# Patient Record
Sex: FEMALE | Race: WHITE | NOT HISPANIC OR LATINO | Employment: PART TIME | URBAN - METROPOLITAN AREA
[De-identification: names, ages, dates, MRNs, and addresses within clinical notes are randomized per-mention and may not be internally consistent; named-entity substitution may affect disease eponyms.]

---

## 2020-06-23 ENCOUNTER — HISTORICAL (OUTPATIENT)
Dept: LAB | Facility: HOSPITAL | Age: 65
End: 2020-06-23

## 2020-06-23 LAB
ABS NEUT (OLG): 3.77 X10(3)/MCL (ref 2.1–9.2)
ALBUMIN SERPL-MCNC: 3.6 GM/DL (ref 3.4–4.8)
ALBUMIN/GLOB SERPL: 1.1 RATIO (ref 1.1–2)
ALP SERPL-CCNC: 114 UNIT/L (ref 40–150)
ALT SERPL-CCNC: 17 UNIT/L (ref 0–55)
AST SERPL-CCNC: 19 UNIT/L (ref 5–34)
BASOPHILS # BLD AUTO: 0.03 X10(3)/MCL (ref 0–0.2)
BASOPHILS NFR BLD AUTO: 0.3 % (ref 0–1)
BILIRUB SERPL-MCNC: 0.3 MG/DL (ref 0.2–1.2)
BILIRUBIN DIRECT+TOT PNL SERPL-MCNC: 0.1 MG/DL (ref 0–0.8)
BILIRUBIN DIRECT+TOT PNL SERPL-MCNC: 0.2 MG/DL (ref 0–0.5)
BUN SERPL-MCNC: 12.8 MG/DL (ref 9.8–20.1)
CALCIUM SERPL-MCNC: 9.3 MG/DL (ref 8.4–10.2)
CHLORIDE SERPL-SCNC: 105 MMOL/L (ref 98–107)
CHOLEST SERPL-MCNC: 139 MG/DL
CHOLEST/HDLC SERPL: 3 {RATIO} (ref 0–5)
CO2 SERPL-SCNC: 29 MMOL/L (ref 23–31)
CREAT SERPL-MCNC: 0.82 MG/DL (ref 0.57–1.11)
EOSINOPHIL # BLD AUTO: 0.2 X10(3)/MCL (ref 0–0.9)
EOSINOPHIL NFR BLD AUTO: 2.3 % (ref 0–6.4)
ERYTHROCYTE [DISTWIDTH] IN BLOOD BY AUTOMATED COUNT: 12.9 % (ref 11.5–17)
GLOBULIN SER-MCNC: 3.2 GM/DL (ref 2.4–3.5)
GLUCOSE SERPL-MCNC: 94 MG/DL (ref 82–115)
HCT VFR BLD AUTO: 44.1 % (ref 37–47)
HDLC SERPL-MCNC: 44 MG/DL (ref 40–60)
HGB BLD-MCNC: 14.2 GM/DL (ref 12–16)
IMM GRANULOCYTES # BLD AUTO: 0.01 10*3/UL (ref 0–0.02)
IMM GRANULOCYTES NFR BLD AUTO: 0.1 % (ref 0–0.43)
LDLC SERPL CALC-MCNC: 73 MG/DL (ref 50–140)
LYMPHOCYTES # BLD AUTO: 3.98 X10(3)/MCL (ref 0.6–4.6)
LYMPHOCYTES NFR BLD AUTO: 46 % (ref 16–44)
MCH RBC QN AUTO: 29.3 PG (ref 27–31)
MCHC RBC AUTO-ENTMCNC: 32.2 GM/DL (ref 33–36)
MCV RBC AUTO: 90.9 FL (ref 80–94)
MONOCYTES # BLD AUTO: 0.66 X10(3)/MCL (ref 0.1–1.3)
MONOCYTES NFR BLD AUTO: 7.6 % (ref 4–12.1)
NEUTROPHILS # BLD AUTO: 3.77 X10(3)/MCL (ref 2.1–9.2)
NEUTROPHILS NFR BLD AUTO: 43.7 % (ref 43–73)
NRBC BLD AUTO-RTO: 0 % (ref 0–0.2)
PLATELET # BLD AUTO: 200 X10(3)/MCL (ref 130–400)
PMV BLD AUTO: 10.6 FL (ref 7.4–10.4)
POTASSIUM SERPL-SCNC: 4 MMOL/L (ref 3.5–5.1)
PROT SERPL-MCNC: 6.8 GM/DL (ref 5.8–7.6)
RBC # BLD AUTO: 4.85 X10(6)/MCL (ref 4.2–5.4)
SODIUM SERPL-SCNC: 141 MMOL/L (ref 136–145)
TRIGL SERPL-MCNC: 111 MG/DL (ref 0–150)
TSH SERPL-ACNC: 1.23 UIU/ML (ref 0.35–4.94)
VLDLC SERPL CALC-MCNC: 22 MG/DL
WBC # SPEC AUTO: 8.6 X10(3)/MCL (ref 4.5–11.5)

## 2021-06-03 ENCOUNTER — HISTORICAL (OUTPATIENT)
Dept: LAB | Facility: HOSPITAL | Age: 66
End: 2021-06-03

## 2021-06-03 LAB
ABS NEUT (OLG): 3.87 X10(3)/MCL (ref 2.1–9.2)
ALBUMIN SERPL-MCNC: 3.9 GM/DL (ref 3.4–4.8)
ALBUMIN/GLOB SERPL: 1.1 RATIO (ref 1.1–2)
ALP SERPL-CCNC: 111 UNIT/L (ref 40–150)
ALT SERPL-CCNC: 20 UNIT/L (ref 0–55)
AST SERPL-CCNC: 21 UNIT/L (ref 5–34)
BASOPHILS # BLD AUTO: 0.04 X10(3)/MCL (ref 0–0.2)
BASOPHILS NFR BLD AUTO: 0.5 % (ref 0–1)
BILIRUB SERPL-MCNC: 0.6 MG/DL (ref 0.2–1.2)
BILIRUBIN DIRECT+TOT PNL SERPL-MCNC: 0.2 MG/DL (ref 0–0.5)
BILIRUBIN DIRECT+TOT PNL SERPL-MCNC: 0.4 MG/DL (ref 0–0.8)
BUN SERPL-MCNC: 13 MG/DL (ref 9.8–20.1)
CALCIUM SERPL-MCNC: 10.1 MG/DL (ref 8.4–10.2)
CHLORIDE SERPL-SCNC: 105 MMOL/L (ref 98–107)
CHOLEST SERPL-MCNC: 143 MG/DL
CHOLEST/HDLC SERPL: 3 {RATIO} (ref 0–5)
CO2 SERPL-SCNC: 30 MMOL/L (ref 23–31)
CREAT SERPL-MCNC: 0.79 MG/DL (ref 0.57–1.11)
EOSINOPHIL # BLD AUTO: 0.22 X10(3)/MCL (ref 0–0.9)
EOSINOPHIL NFR BLD AUTO: 2.5 % (ref 0–6.4)
ERYTHROCYTE [DISTWIDTH] IN BLOOD BY AUTOMATED COUNT: 12.7 % (ref 11.5–17)
GLOBULIN SER-MCNC: 3.5 GM/DL (ref 2.4–3.5)
GLUCOSE SERPL-MCNC: 109 MG/DL (ref 82–115)
HCT VFR BLD AUTO: 45.9 % (ref 37–47)
HDLC SERPL-MCNC: 46 MG/DL (ref 40–60)
HGB BLD-MCNC: 15.2 GM/DL (ref 12–16)
IMM GRANULOCYTES # BLD AUTO: 0.02 10*3/UL (ref 0–0.02)
IMM GRANULOCYTES NFR BLD AUTO: 0.2 % (ref 0–0.43)
LDLC SERPL CALC-MCNC: 73 MG/DL (ref 50–140)
LYMPHOCYTES # BLD AUTO: 3.82 X10(3)/MCL (ref 0.6–4.6)
LYMPHOCYTES NFR BLD AUTO: 44.3 % (ref 16–44)
MCH RBC QN AUTO: 29.8 PG (ref 27–31)
MCHC RBC AUTO-ENTMCNC: 33.1 GM/DL (ref 33–36)
MCV RBC AUTO: 90 FL (ref 80–94)
MONOCYTES # BLD AUTO: 0.66 X10(3)/MCL (ref 0.1–1.3)
MONOCYTES NFR BLD AUTO: 7.6 % (ref 4–12.1)
NEUTROPHILS # BLD AUTO: 3.87 X10(3)/MCL (ref 2.1–9.2)
NEUTROPHILS NFR BLD AUTO: 44.9 % (ref 43–73)
NRBC BLD AUTO-RTO: 0 % (ref 0–0.2)
PLATELET # BLD AUTO: 208 X10(3)/MCL (ref 130–400)
PMV BLD AUTO: 10.7 FL (ref 7.4–10.4)
POTASSIUM SERPL-SCNC: 4.3 MMOL/L (ref 3.5–5.1)
PROT SERPL-MCNC: 7.4 GM/DL (ref 5.8–7.6)
RBC # BLD AUTO: 5.1 X10(6)/MCL (ref 4.2–5.4)
SODIUM SERPL-SCNC: 141 MMOL/L (ref 136–145)
TRIGL SERPL-MCNC: 119 MG/DL (ref 0–150)
TSH SERPL-ACNC: 1.76 UIU/ML (ref 0.35–4.94)
VLDLC SERPL CALC-MCNC: 24 MG/DL
WBC # SPEC AUTO: 8.6 X10(3)/MCL (ref 4.5–11.5)

## 2021-06-04 LAB
PAP RECOMMENDATION EXT: NORMAL
PAP SMEAR: NORMAL

## 2021-07-09 LAB — BCS RECOMMENDATION EXT: NORMAL

## 2022-04-10 ENCOUNTER — HISTORICAL (OUTPATIENT)
Dept: ADMINISTRATIVE | Facility: HOSPITAL | Age: 67
End: 2022-04-10
Payer: COMMERCIAL

## 2022-04-27 VITALS
HEIGHT: 65 IN | BODY MASS INDEX: 27.7 KG/M2 | WEIGHT: 166.25 LBS | SYSTOLIC BLOOD PRESSURE: 131 MMHG | OXYGEN SATURATION: 99 % | DIASTOLIC BLOOD PRESSURE: 62 MMHG

## 2022-05-25 ENCOUNTER — TELEPHONE (OUTPATIENT)
Dept: FAMILY MEDICINE | Facility: CLINIC | Age: 67
End: 2022-05-25
Payer: COMMERCIAL

## 2022-05-25 NOTE — TELEPHONE ENCOUNTER
Patient stated she goes to the [facility] lab for labwork so I notified her that she will not need physical lab orders, but if there are issues when she arrives she can contact the office for assistance. Patient verbalized understanding        ----- Message from Ezekiel Michaels sent at 5/25/2022  1:56 PM CDT -----  .Type:  Needs Medical Advice    Who Called: Ledy  Symptoms (please be specific):    How long has patient had these symptoms:    Pharmacy name and phone #:    Would the patient rather a call back or a response via [facility]?   Best Call Back Number: [phone]  Additional Information: She needs to know where she can go and get her paperwork for the blood work ahead of her apt.

## 2022-05-31 ENCOUNTER — TELEPHONE (OUTPATIENT)
Dept: FAMILY MEDICINE | Facility: CLINIC | Age: 67
End: 2022-05-31
Payer: COMMERCIAL

## 2022-05-31 DIAGNOSIS — Z00.00 WELLNESS EXAMINATION: Primary | ICD-10-CM

## 2022-05-31 DIAGNOSIS — E03.9 HYPOTHYROIDISM, UNSPECIFIED TYPE: ICD-10-CM

## 2022-06-02 ENCOUNTER — LAB VISIT (OUTPATIENT)
Dept: LAB | Facility: HOSPITAL | Age: 67
End: 2022-06-02
Attending: NURSE PRACTITIONER
Payer: COMMERCIAL

## 2022-06-02 DIAGNOSIS — R82.71 BACTERIURIA: Primary | ICD-10-CM

## 2022-06-02 DIAGNOSIS — E03.9 HYPOTHYROIDISM, UNSPECIFIED TYPE: ICD-10-CM

## 2022-06-02 DIAGNOSIS — Z00.00 WELLNESS EXAMINATION: ICD-10-CM

## 2022-06-02 DIAGNOSIS — R82.71 BACTERIURIA: ICD-10-CM

## 2022-06-02 LAB
ALBUMIN SERPL-MCNC: 3.9 GM/DL (ref 3.4–4.8)
ALBUMIN/GLOB SERPL: 1.1 RATIO (ref 1.1–2)
ALP SERPL-CCNC: 95 UNIT/L (ref 40–150)
ALT SERPL-CCNC: 18 UNIT/L (ref 0–55)
APPEARANCE UR: ABNORMAL
AST SERPL-CCNC: 21 UNIT/L (ref 5–34)
BACTERIA #/AREA URNS AUTO: ABNORMAL /HPF
BASOPHILS # BLD AUTO: 0.04 X10(3)/MCL (ref 0–0.2)
BASOPHILS NFR BLD AUTO: 0.5 %
BILIRUB UR QL STRIP.AUTO: NEGATIVE MG/DL
BILIRUBIN DIRECT+TOT PNL SERPL-MCNC: 0.7 MG/DL
BUN SERPL-MCNC: 11.4 MG/DL (ref 9.8–20.1)
CALCIUM SERPL-MCNC: 9.6 MG/DL (ref 8.4–10.2)
CHLORIDE SERPL-SCNC: 106 MMOL/L (ref 98–107)
CHOLEST SERPL-MCNC: 144 MG/DL
CHOLEST/HDLC SERPL: 3 {RATIO} (ref 0–5)
CO2 SERPL-SCNC: 27 MMOL/L (ref 23–31)
COLOR UR AUTO: YELLOW
CREAT SERPL-MCNC: 0.81 MG/DL (ref 0.55–1.02)
EOSINOPHIL # BLD AUTO: 0.22 X10(3)/MCL (ref 0–0.9)
EOSINOPHIL NFR BLD AUTO: 2.9 %
ERYTHROCYTE [DISTWIDTH] IN BLOOD BY AUTOMATED COUNT: 12.9 % (ref 11.5–17)
EST. AVERAGE GLUCOSE BLD GHB EST-MCNC: 116.9 MG/DL
GLOBULIN SER-MCNC: 3.4 GM/DL (ref 2.4–3.5)
GLUCOSE SERPL-MCNC: 104 MG/DL (ref 82–115)
GLUCOSE UR QL STRIP.AUTO: NEGATIVE MG/DL
HBA1C MFR BLD: 5.7 %
HCT VFR BLD AUTO: 46.9 % (ref 37–47)
HDLC SERPL-MCNC: 46 MG/DL (ref 35–60)
HGB BLD-MCNC: 15.4 GM/DL (ref 12–16)
IMM GRANULOCYTES # BLD AUTO: 0.02 X10(3)/MCL (ref 0–0.02)
IMM GRANULOCYTES NFR BLD AUTO: 0.3 % (ref 0–0.43)
KETONES UR QL STRIP.AUTO: NEGATIVE MG/DL
LDLC SERPL CALC-MCNC: 76 MG/DL (ref 50–140)
LEUKOCYTE ESTERASE UR QL STRIP.AUTO: ABNORMAL UNIT/L
LYMPHOCYTES # BLD AUTO: 3.32 X10(3)/MCL (ref 0.6–4.6)
LYMPHOCYTES NFR BLD AUTO: 43.7 %
MCH RBC QN AUTO: 30.1 PG (ref 27–31)
MCHC RBC AUTO-ENTMCNC: 32.8 MG/DL (ref 33–36)
MCV RBC AUTO: 91.6 FL (ref 80–94)
MONOCYTES # BLD AUTO: 0.57 X10(3)/MCL (ref 0.1–1.3)
MONOCYTES NFR BLD AUTO: 7.5 %
MUCOUS THREADS URNS QL MICRO: ABNORMAL /LPF
NEUTROPHILS # BLD AUTO: 3.4 X10(3)/MCL (ref 2.1–9.2)
NEUTROPHILS NFR BLD AUTO: 45.1 %
NITRITE UR QL STRIP.AUTO: NEGATIVE
NRBC BLD AUTO-RTO: 0 %
PH UR STRIP.AUTO: 6.5 [PH]
PLATELET # BLD AUTO: 213 X10(3)/MCL (ref 130–400)
PMV BLD AUTO: 10.4 FL (ref 9.4–12.4)
POTASSIUM SERPL-SCNC: 4.2 MMOL/L (ref 3.5–5.1)
PROT SERPL-MCNC: 7.3 GM/DL (ref 5.8–7.6)
PROT UR QL STRIP.AUTO: NEGATIVE MG/DL
RBC # BLD AUTO: 5.12 X10(6)/MCL (ref 4.2–5.4)
RBC #/AREA URNS AUTO: ABNORMAL /HPF
RBC UR QL AUTO: ABNORMAL UNIT/L
SODIUM SERPL-SCNC: 142 MMOL/L (ref 136–145)
SP GR UR STRIP.AUTO: 1.02
SQUAMOUS #/AREA URNS AUTO: ABNORMAL /LPF
TRIGL SERPL-MCNC: 112 MG/DL (ref 37–140)
TSH SERPL-ACNC: 1.6 UIU/ML (ref 0.35–4.94)
UROBILINOGEN UR STRIP-ACNC: 0.2 MG/DL
VLDLC SERPL CALC-MCNC: 22 MG/DL
WBC # SPEC AUTO: 7.6 X10(3)/MCL (ref 4.5–11.5)
WBC #/AREA URNS AUTO: ABNORMAL /HPF

## 2022-06-02 PROCEDURE — 87088 URINE BACTERIA CULTURE: CPT

## 2022-06-02 PROCEDURE — 84443 ASSAY THYROID STIM HORMONE: CPT

## 2022-06-02 PROCEDURE — 80053 COMPREHEN METABOLIC PANEL: CPT

## 2022-06-02 PROCEDURE — 83036 HEMOGLOBIN GLYCOSYLATED A1C: CPT

## 2022-06-02 PROCEDURE — 80061 LIPID PANEL: CPT

## 2022-06-02 PROCEDURE — 85025 COMPLETE CBC W/AUTO DIFF WBC: CPT

## 2022-06-02 PROCEDURE — 81001 URINALYSIS AUTO W/SCOPE: CPT

## 2022-06-02 PROCEDURE — 36415 COLL VENOUS BLD VENIPUNCTURE: CPT

## 2022-06-04 LAB — BACTERIA UR CULT: NORMAL

## 2022-06-06 ENCOUNTER — OFFICE VISIT (OUTPATIENT)
Dept: FAMILY MEDICINE | Facility: CLINIC | Age: 67
End: 2022-06-06
Payer: COMMERCIAL

## 2022-06-06 VITALS
DIASTOLIC BLOOD PRESSURE: 74 MMHG | HEART RATE: 83 BPM | WEIGHT: 165.88 LBS | RESPIRATION RATE: 18 BRPM | OXYGEN SATURATION: 98 % | SYSTOLIC BLOOD PRESSURE: 122 MMHG | BODY MASS INDEX: 27.64 KG/M2 | TEMPERATURE: 98 F | HEIGHT: 65 IN

## 2022-06-06 DIAGNOSIS — E78.5 HYPERLIPIDEMIA, UNSPECIFIED HYPERLIPIDEMIA TYPE: ICD-10-CM

## 2022-06-06 DIAGNOSIS — R00.2 PALPITATIONS: ICD-10-CM

## 2022-06-06 DIAGNOSIS — Z00.00 WELLNESS EXAMINATION: Primary | ICD-10-CM

## 2022-06-06 DIAGNOSIS — E03.9 HYPOTHYROIDISM, UNSPECIFIED TYPE: ICD-10-CM

## 2022-06-06 DIAGNOSIS — F17.210 CIGARETTE SMOKER: ICD-10-CM

## 2022-06-06 DIAGNOSIS — Z12.31 BREAST CANCER SCREENING BY MAMMOGRAM: ICD-10-CM

## 2022-06-06 DIAGNOSIS — Z78.0 POSTMENOPAUSAL: ICD-10-CM

## 2022-06-06 DIAGNOSIS — Z12.11 COLON CANCER SCREENING: ICD-10-CM

## 2022-06-06 PROCEDURE — 99396 PREV VISIT EST AGE 40-64: CPT | Mod: ,,, | Performed by: FAMILY MEDICINE

## 2022-06-06 PROCEDURE — 99396 PR PREVENTIVE VISIT,EST,40-64: ICD-10-PCS | Mod: ,,, | Performed by: FAMILY MEDICINE

## 2022-06-06 RX ORDER — LEVOTHYROXINE SODIUM 88 UG/1
88 TABLET ORAL DAILY
COMMUNITY
Start: 2022-05-13 | End: 2022-07-20 | Stop reason: SDUPTHER

## 2022-06-06 RX ORDER — METOPROLOL SUCCINATE 50 MG/1
50 TABLET, EXTENDED RELEASE ORAL DAILY
COMMUNITY
Start: 2022-05-13 | End: 2022-07-20 | Stop reason: SDUPTHER

## 2022-06-06 RX ORDER — ATORVASTATIN CALCIUM 10 MG/1
10 TABLET, FILM COATED ORAL DAILY
COMMUNITY
Start: 2022-05-13 | End: 2022-07-20 | Stop reason: SDUPTHER

## 2022-06-06 NOTE — PROGRESS NOTES
"Subjective:        Patient ID: Ledy Kim is a 66 y.o. female.    Chief Complaint: Annual Exam      Patient presents to clinic unaccompanied for her wellness.  She did labs prior to appointment and it was reviewed with patient at time of appointment today.  Her ua showed concern for uti but denies symptoms.  Urine culture added. Will call with results when available    She has a history of HLD and palpitations.  She is on atrovastatin and metoprolol and asa    She has history of hypothyroidism.  tsh is wnl.  On synthroid 88mcg    She is due for her mammogram at [facility].  Last 7/2021.  Last dexa 7/2021 at [facility] showed osteopenia.  Last pap 6/2021 is normal and HPV negative.  She is due for cologuard.      She is smoker.  Working on [employer] back.     Review of Systems   Constitutional: Negative.    HENT: Negative.    Respiratory: Negative.    Cardiovascular: Negative.    Gastrointestinal: Negative.    Genitourinary: Negative.          Review of patient's allergies indicates:   Allergen Reactions    Codeine Itching      Vitals:    06/06/22 0921   BP: 122/74   BP Location: Left arm   Pulse: 83   Resp: 18   Temp: 98.3 °F (36.8 °C)   SpO2: 98%   Weight: 75.3 kg (165 lb 14.4 oz)   Height: 5' 5" (1.651 m)      Social History     Socioeconomic History    Marital status:    Tobacco Use    Smoking status: Heavy Tobacco Smoker     Packs/day: 1.00    Smokeless tobacco: Never Used   Substance and Sexual Activity    Alcohol use: Not Currently    Drug use: Never    Sexual activity: Yes      History reviewed. No pertinent family history.       Objective:     Physical Exam  Vitals and nursing note reviewed.   Constitutional:       Appearance: Normal appearance. She is normal weight.   HENT:      Head: Normocephalic and atraumatic.      Nose: Nose normal.      Mouth/Throat:      Mouth: Mucous membranes are moist.      Pharynx: Oropharynx is clear.   Eyes:      Extraocular Movements: Extraocular movements intact. "   Cardiovascular:      Rate and Rhythm: Normal rate and regular rhythm.      Pulses: Normal pulses.      Heart sounds: Normal heart sounds.   Pulmonary:      Effort: Pulmonary effort is normal.      Breath sounds: Normal breath sounds.   Musculoskeletal:         General: Normal range of motion.      Cervical back: Normal range of motion.   Skin:     General: Skin is warm and dry.   Neurological:      General: No focal deficit present.      Mental Status: She is alert and oriented to person, place, and time. Mental status is at baseline.   Psychiatric:         Mood and Affect: Mood normal.       Current Outpatient Medications on File Prior to Visit   Medication Sig Dispense Refill    atorvastatin (LIPITOR) 10 MG tablet Take 10 mg by mouth once daily.      levothyroxine (SYNTHROID) 88 MCG tablet Take 88 mcg by mouth once daily.      metoprolol succinate (TOPROL-XL) 50 MG 24 hr tablet Take 50 mg by mouth once daily.       No current facility-administered medications on file prior to visit.     Health Maintenance   Topic Date Due    Hepatitis C Screening  Never done    TETANUS VACCINE  Never done    Mammogram  07/09/2022    Lipid Panel  06/02/2027      Results for orders placed or performed in visit on 06/02/22   Urine culture    Specimen: Urine, Clean Catch   Result Value Ref Range    Urine Culture No Significant Growth    Comprehensive Metabolic Panel   Result Value Ref Range    Sodium Level 142 136 - 145 mmol/L    Potassium Level 4.2 3.5 - 5.1 mmol/L    Chloride 106 98 - 107 mmol/L    Carbon Dioxide 27 23 - 31 mmol/L    Glucose Level 104 82 - 115 mg/dL    Blood Urea Nitrogen 11.4 9.8 - 20.1 mg/dL    Creatinine 0.81 0.55 - 1.02 mg/dL    Calcium Level Total 9.6 8.4 - 10.2 mg/dL    Protein Total 7.3 5.8 - 7.6 gm/dL    Albumin Level 3.9 3.4 - 4.8 gm/dL    Globulin 3.4 2.4 - 3.5 gm/dL    Albumin/Globulin Ratio 1.1 1.1 - 2.0 ratio    Bilirubin Total 0.7 <=1.5 mg/dL    Alkaline Phosphatase 95 40 - 150 unit/L     Alanine Aminotransferase 18 0 - 55 unit/L    Aspartate Aminotransferase 21 5 - 34 unit/L   Hemoglobin A1C   Result Value Ref Range    Hemoglobin A1c 5.7 <=7.0 %    Estimated Average Glucose 116.9 mg/dL   TSH   Result Value Ref Range    Thyroid Stimulating Hormone 1.5968 0.3500 - 4.9400 uIU/mL   Lipid Panel   Result Value Ref Range    Cholesterol Total 144 <=200 mg/dL    HDL Cholesterol 46 35 - 60 mg/dL    Triglyceride 112 37 - 140 mg/dL    Cholesterol/HDL Ratio 3 0 - 5    Very Low Density Lipoprotein 22     LDL Cholesterol 76.00 50.00 - 140.00 mg/dL   CBC with Differential   Result Value Ref Range    WBC 7.6 4.5 - 11.5 x10(3)/mcL    RBC 5.12 4.20 - 5.40 x10(6)/mcL    Hgb 15.4 12.0 - 16.0 gm/dL    Hct 46.9 37.0 - 47.0 %    MCV 91.6 80.0 - 94.0 fL    MCH 30.1 27.0 - 31.0 pg    MCHC 32.8 (L) 33.0 - 36.0 mg/dL    RDW 12.9 11.5 - 17.0 %    Platelet 213 130 - 400 x10(3)/mcL    MPV 10.4 9.4 - 12.4 fL    Neut % 45.1 %    Lymph % 43.7 %    Mono % 7.5 %    Eos % 2.9 %    Basophil % 0.5 %    Lymph # 3.32 0.6 - 4.6 x10(3)/mcL    Neut # 3.4 2.1 - 9.2 x10(3)/mcL    Mono # 0.57 0.1 - 1.3 x10(3)/mcL    Eos # 0.22 0 - 0.9 x10(3)/mcL    Baso # 0.04 0 - 0.2 x10(3)/mcL    IG# 0.02 (H) 0 - 0.0155 x10(3)/mcL    IG% 0.3 0 - 0.43 %    NRBC% 0.0 %   Urinalysis   Result Value Ref Range    Color, UA Yellow Yellow, Colorless, Other, Clear    Appearance, UA Hazy (A) Clear    Specific Gravity, UA 1.020     pH, UA 6.5 5.0, 5.5, 6.0, 6.5, 7.0, 7.5, 8.0, 8.5    Protein, UA Negative Negative, 300  mg/dL    Glucose, UA Negative Negative, Normal mg/dL    Ketones, UA Negative Negative, +1, +2, +3, +4, +5, >=160 mg/dL    Blood, UA Moderate (A) Negative unit/L    Bilirubin, UA Negative Negative mg/dL    Urobilinogen, UA 0.2 0.2, 1.0, Normal mg/dL    Nitrites, UA Negative Negative    Leukocyte Esterase, UA Moderate (A) Negative, 75  unit/L   Urinalysis, Microscopic   Result Value Ref Range    Bacteria, UA Few (A) None Seen, Rare, Occasional /HPF     Mucous, UA Occ (A) None Seen /LPF    RBC, UA 0-2 None Seen, 0-2, 3-5, 0-5 /HPF    WBC, UA 0-2 None Seen, 0-2, 3-5, 0-5 /HPF    Squamous Epithelial Cells, UA Moderate (A) None Seen, Rare, Occasional, Occ /LPF          Assessment & Plan:     Active Problem List with Overview Notes    Diagnosis Date Noted    Wellness examination 06/06/2022    Hyperlipidemia 06/06/2022    Palpitations 06/06/2022    Hypothyroidism 06/06/2022    Postmenopausal 06/06/2022    Cigarette smoker 06/06/2022    Breast cancer screening by mammogram 06/06/2022    Colon cancer screening 06/06/2022       1. Wellness examination  Assessment & Plan:  Previously done labs reviewed with patient at time of visit  Pap 6/2021 normal and HPV negative  Mammogram due. Ordered  dexa 6/2021 showed osteopenia  cologuard ordered        Orders:  -     CBC Auto Differential  -     Comprehensive Metabolic Panel  -     Lipid Panel  -     TSH  -     Urinalysis, Reflex to Urine Culture Urine, Clean Catch    2. Breast cancer screening by mammogram  Assessment & Plan:  ordered    Orders:  -     CBC Auto Differential  -     Comprehensive Metabolic Panel  -     Lipid Panel  -     TSH  -     Urinalysis, Reflex to Urine Culture Urine, Clean Catch  -     Mammo Digital Screening Bilat w/ Gab    3. Colon cancer screening  Assessment & Plan:  cologuard ordered    Orders:  -     Cologuard Screening (Multitarget Stool DNA)  -     Cologuard Screening (Multitarget Stool DNA)  -     CBC Auto Differential  -     Comprehensive Metabolic Panel  -     Lipid Panel  -     TSH  -     Urinalysis, Reflex to Urine Culture Urine, Clean Catch    4. Cigarette smoker  Assessment & Plan:  Referral placed    Orders:  -     Ambulatory referral/consult to Smoking Cessation Program  -     CBC Auto Differential  -     Comprehensive Metabolic Panel  -     Lipid Panel  -     TSH  -     Urinalysis, Reflex to Urine Culture Urine, Clean Catch    5. Hypothyroidism, unspecified type  Assessment &  Plan:  Lab Results   Component Value Date    TSH 1.5968 06/02/2022     Continue on current meds    Orders:  -     CBC Auto Differential  -     Comprehensive Metabolic Panel  -     Lipid Panel  -     TSH  -     Urinalysis, Reflex to Urine Culture Urine, Clean Catch    6. Postmenopausal  Assessment & Plan:  utd on dexa.     Orders:  -     CBC Auto Differential  -     Comprehensive Metabolic Panel  -     Lipid Panel  -     TSH  -     Urinalysis, Reflex to Urine Culture Urine, Clean Catch    7. Palpitations  Assessment & Plan:  On metoprolol    Orders:  -     CBC Auto Differential  -     Comprehensive Metabolic Panel  -     Lipid Panel  -     TSH  -     Urinalysis, Reflex to Urine Culture Urine, Clean Catch    8. Hyperlipidemia, unspecified hyperlipidemia type  Assessment & Plan:  On statin    Orders:  -     CBC Auto Differential  -     Comprehensive Metabolic Panel  -     Lipid Panel  -     TSH  -     Urinalysis, Reflex to Urine Culture Urine, Clean Catch       Follow up in about 1 year (around 6/6/2023) for wellness with labs.

## 2022-06-06 NOTE — ASSESSMENT & PLAN NOTE
Previously done labs reviewed with patient at time of visit  Pap 6/2021 normal and HPV negative  Mammogram due. Ordered  dexa 6/2021 showed osteopenia  cologuard ordered

## 2022-06-25 LAB — NONINV COLON CA DNA+OCC BLD SCRN STL QL: NEGATIVE

## 2022-06-27 NOTE — PROGRESS NOTES
Please call patient to inform that cologuard testing is normal/negative.  Will plan to screen for colon cancer again in 3 years.

## 2022-06-28 ENCOUNTER — OFFICE VISIT (OUTPATIENT)
Dept: FAMILY MEDICINE | Facility: CLINIC | Age: 67
End: 2022-06-28
Payer: COMMERCIAL

## 2022-06-28 ENCOUNTER — DOCUMENTATION ONLY (OUTPATIENT)
Dept: ADMINISTRATIVE | Facility: HOSPITAL | Age: 67
End: 2022-06-28
Payer: COMMERCIAL

## 2022-06-28 VITALS
RESPIRATION RATE: 17 BRPM | DIASTOLIC BLOOD PRESSURE: 70 MMHG | SYSTOLIC BLOOD PRESSURE: 120 MMHG | WEIGHT: 166.13 LBS | TEMPERATURE: 98 F | HEART RATE: 63 BPM | OXYGEN SATURATION: 99 % | BODY MASS INDEX: 27.64 KG/M2

## 2022-06-28 DIAGNOSIS — H61.23 BILATERAL IMPACTED CERUMEN: ICD-10-CM

## 2022-06-28 PROBLEM — H61.20 CERUMEN IMPACTION: Status: ACTIVE | Noted: 2022-06-28

## 2022-06-28 PROCEDURE — 99212 PR OFFICE/OUTPT VISIT, EST, LEVL II, 10-19 MIN: ICD-10-PCS | Mod: 25,,, | Performed by: FAMILY MEDICINE

## 2022-06-28 PROCEDURE — 69209 PR REMOVAL IMPACTED CERUMEN USING IRRIGATION/LAVAGE, UNILATERAL: ICD-10-PCS | Mod: 50,,, | Performed by: FAMILY MEDICINE

## 2022-06-28 PROCEDURE — 99212 OFFICE O/P EST SF 10 MIN: CPT | Mod: 25,,, | Performed by: FAMILY MEDICINE

## 2022-06-28 PROCEDURE — 69209 REMOVE IMPACTED EAR WAX UNI: CPT | Mod: 50,,, | Performed by: FAMILY MEDICINE

## 2022-06-28 NOTE — PROGRESS NOTES
Subjective:        Patient ID: Ledy Kim is a 66 y.o. female.    Chief Complaint: Follow-up (Paient reports build up of cerumen. Denies ear pain)      Patient presents to clinic unaccompanied for cerumen impaction.  Has been swimming more.  Denies pain or discharge.  Denies decreased hearing.  Has been using debrox in her right ear.             She has a history of HLD and palpitations.  She is on atrovastatin and metoprolol and asa     She has history of hypothyroidism.  tsh is wnl.  On synthroid 88mcg     She is due for her mammogram at [facility].  Last 7/2021.  Last dexa 7/2021 at [facility] showed osteopenia.  Last pap 6/2021 is normal and HPV negative.  She is due for cologuard.       She is smoker.  Working on cutting back.     She is due for her wellness in June.     Review of Systems   Constitutional: Negative.    HENT: Negative.  Negative for ear discharge, ear pain, sinus pressure, sinus pain, sneezing, sore throat, tinnitus and voice change.    Respiratory: Negative.    Cardiovascular: Negative.    Gastrointestinal: Negative.    Genitourinary: Negative.          Review of patient's allergies indicates:   Allergen Reactions    Codeine Itching      Vitals:    06/28/22 1504   BP: 120/70   BP Location: Left arm   Pulse: 63   Resp: 17   Temp: 98 °F (36.7 °C)   SpO2: 99%   Weight: 75.3 kg (166 lb 1.6 oz)      Social History     Socioeconomic History    Marital status:    Tobacco Use    Smoking status: Heavy Tobacco Smoker     Packs/day: 1.00    Smokeless tobacco: Never Used   Substance and Sexual Activity    Alcohol use: Not Currently    Drug use: Never    Sexual activity: Yes      History reviewed. No pertinent family history.       Objective:     Physical Exam  Vitals reviewed.   Constitutional:       Appearance: Normal appearance. She is normal weight.   HENT:      Head: Normocephalic and atraumatic.      Right Ear: External ear normal. There is impacted cerumen.      Left Ear: External ear normal.  There is impacted cerumen.      Nose: Nose normal.      Mouth/Throat:      Mouth: Mucous membranes are moist.      Pharynx: Oropharynx is clear.   Eyes:      Extraocular Movements: Extraocular movements intact.   Cardiovascular:      Rate and Rhythm: Normal rate and regular rhythm.      Pulses: Normal pulses.      Heart sounds: Normal heart sounds.   Pulmonary:      Effort: Pulmonary effort is normal.      Breath sounds: Normal breath sounds.   Musculoskeletal:         General: Normal range of motion.      Cervical back: Normal range of motion.   Skin:     General: Skin is warm and dry.   Neurological:      General: No focal deficit present.      Mental Status: She is alert and oriented to person, place, and time. Mental status is at baseline.   Psychiatric:         Mood and Affect: Mood normal.       Current Outpatient Medications on File Prior to Visit   Medication Sig Dispense Refill    atorvastatin (LIPITOR) 10 MG tablet Take 10 mg by mouth once daily.      levothyroxine (SYNTHROID) 88 MCG tablet Take 88 mcg by mouth once daily.      metoprolol succinate (TOPROL-XL) 50 MG 24 hr tablet Take 50 mg by mouth once daily.       No current facility-administered medications on file prior to visit.     Health Maintenance   Topic Date Due    Hepatitis C Screening  Never done    TETANUS VACCINE  Never done    Mammogram  07/09/2022    Lipid Panel  06/02/2027      Results for orders placed or performed in visit on 06/28/22    MAMMOGRAPHY   Result Value Ref Range    BCS Recommendation External Repeat mammogram in 1 year     PAP SMEAR   Result Value Ref Range    PAP Recommendation External Pap in 3 years     Pap Negative for intraephithelial lesion or malignancy Negative for intraephithelial lesion or malignancy, Other          Assessment & Plan:     Active Problem List with Overview Notes    Diagnosis Date Noted    Cerumen impaction 06/28/2022    Wellness examination 06/06/2022    Hyperlipidemia 06/06/2022     Palpitations 06/06/2022    Hypothyroidism 06/06/2022    Postmenopausal 06/06/2022    Cigarette smoker 06/06/2022    Breast cancer screening by mammogram 06/06/2022    Colon cancer screening 06/06/2022       1. Bilateral impacted cerumen  Assessment & Plan:  Ear foreign body removal procedure:  Date: 6/28/22  Confirmed: patient, procedure, side, safety procedures followed.   Performed by: nurse and self   Supervised by:  self  Informed consent: not signed, covered by global consent for care.   Indication: hearing disturbance   Location: Bilateral   Preparation and technique: positioned sitting upright, method including (cerumen loop, irrigation (with hydrogen peroxide, with warm tap water)).   Results: foreign body removal (complete, type cerumen).   Procedure tolerated: Well with no Complications.       Right ear cerumen removed completed.  TM and canal wnl.  Left cerumen removal incomplete.  Procedure discontinued due to patient discomfort.  She will use debrox otc daily x 1 week and return to clinic for repeat attempt at removal of right ear cerumen impaction.             Keep scheduled follow up for wellness 6/2023 with labs

## 2022-06-28 NOTE — ASSESSMENT & PLAN NOTE
Ear foreign body removal procedure:  Date: 6/28/22  Confirmed: patient, procedure, side, safety procedures followed.   Performed by: nurse and self   Supervised by:  self  Informed consent: not signed, covered by global consent for care.   Indication: hearing disturbance   Location: Bilateral   Preparation and technique: positioned sitting upright, method including (cerumen loop, irrigation (with hydrogen peroxide, with warm tap water)).   Results: foreign body removal (complete, type cerumen).   Procedure tolerated: Well with no Complications.       Right ear cerumen removed completed.  TM and canal wnl.  Left cerumen removal incomplete.  Procedure discontinued due to patient discomfort.  She will use debrox otc daily x 1 week and return to clinic for repeat attempt at removal of right ear cerumen impaction.

## 2022-07-20 RX ORDER — METOPROLOL SUCCINATE 50 MG/1
50 TABLET, EXTENDED RELEASE ORAL DAILY
Qty: 90 TABLET | Refills: 3 | Status: SHIPPED | OUTPATIENT
Start: 2022-07-20 | End: 2023-05-22

## 2022-07-20 RX ORDER — LEVOTHYROXINE SODIUM 88 UG/1
88 TABLET ORAL DAILY
Qty: 90 TABLET | Refills: 3 | Status: SHIPPED | OUTPATIENT
Start: 2022-07-20 | End: 2023-05-22

## 2022-07-20 RX ORDER — ATORVASTATIN CALCIUM 10 MG/1
10 TABLET, FILM COATED ORAL DAILY
Qty: 90 TABLET | Refills: 3 | Status: SHIPPED | OUTPATIENT
Start: 2022-07-20 | End: 2023-05-22

## 2022-07-20 NOTE — TELEPHONE ENCOUNTER
----- Message from BertramDelores Roel sent at 7/20/2022  9:25 AM CDT -----  Regarding: rx request x3  Type:  RX Refill Request    Who Called: pt  Refill or New Rx: refill  RX Name and Strength: atorvastatin   How is the patient currently taking it? (ex. 1XDay): daily  Is this a 30 day or 90 day RX: 90 day  Preferred Pharmacy with phone number: [email or web address] or Mail Order: Mail   Ordering Provider: Maria Elena   Would the patient rather a call back or a response via [facility]?  Na   Best Call Back Number:na  Additional Information: na     Type:  RX Refill Request    Who Called:  pt  Refill or New Rx: refill  RX Name and Strength: metoprolol succinate   How is the patient currently taking it? (ex. 1XDay): daily  Is this a 30 day or 90 day RX: 90  Preferred Pharmacy with phone number: [email or web address] or Mail Order: Mail  Ordering Provider: Peri  Would the patient rather a call back or a response via [facility]?  na  Best Call Back Number:na  Additional Information: na     Type:  RX Refill Request    Who Called:  pt  Refill or New Rx: refill  RX Name and Strength:Levothyroxine   How is the patient currently taking it? (ex. 1XDay): daily  Is this a 30 day or 90 day RX: 90  Preferred Pharmacy with phone number: [email or web address] or Mail Order: Mail  Ordering Provider: Peri  Would the patient rather a call back or a response via [facility]?  na  Best Call Back Number:na  Additional Information: ghazala

## 2022-07-27 ENCOUNTER — OFFICE VISIT (OUTPATIENT)
Dept: FAMILY MEDICINE | Facility: CLINIC | Age: 67
End: 2022-07-27
Payer: MEDICARE

## 2022-07-27 ENCOUNTER — TELEPHONE (OUTPATIENT)
Dept: FAMILY MEDICINE | Facility: CLINIC | Age: 67
End: 2022-07-27
Payer: MEDICARE

## 2022-07-27 VITALS — HEIGHT: 65 IN | BODY MASS INDEX: 28.32 KG/M2 | WEIGHT: 170 LBS

## 2022-07-27 DIAGNOSIS — J32.9 SINUSITIS, UNSPECIFIED CHRONICITY, UNSPECIFIED LOCATION: Primary | ICD-10-CM

## 2022-07-27 PROCEDURE — 99213 OFFICE O/P EST LOW 20 MIN: CPT | Mod: 95,,, | Performed by: NURSE PRACTITIONER

## 2022-07-27 PROCEDURE — 99213 PR OFFICE/OUTPT VISIT, EST, LEVL III, 20-29 MIN: ICD-10-PCS | Mod: 95,,, | Performed by: NURSE PRACTITIONER

## 2022-07-27 RX ORDER — AZITHROMYCIN 250 MG/1
TABLET, FILM COATED ORAL
Qty: 6 TABLET | Refills: 0 | Status: SHIPPED | OUTPATIENT
Start: 2022-07-27 | End: 2022-08-01

## 2022-07-27 RX ORDER — BENZONATATE 100 MG/1
100 CAPSULE ORAL 3 TIMES DAILY PRN
Qty: 30 CAPSULE | Refills: 0 | Status: SHIPPED | OUTPATIENT
Start: 2022-07-27 | End: 2022-08-06

## 2022-07-27 NOTE — TELEPHONE ENCOUNTER
Patient notified andverbalized understanding. Patient preferred to see EZRA khanna transferred the patient to [facility] to schedule telemed

## 2022-07-27 NOTE — ASSESSMENT & PLAN NOTE
Discussion made regarding Covid-19; declines testing  Rx Z-yin  Rx Tessalon Perles prn  F/U if no improvement  Report to ED for any CP, SOB, and/or worsening symptoms  Verbalizes understanding

## 2022-07-27 NOTE — TELEPHONE ENCOUNTER
----- Message from Lizzette Kim LPN sent at 7/27/2022  9:03 AM CDT -----  Regarding: FW: medical advice  Patient states Monday she ran low grade fever, headache, bloody nose, sinus pressure, and post nasal drip. OTC meds not providing relief. Patient requesting something to be called in. Patient stated she has not been tested for flu or covid. I did advise her that Donna Arredondo may suggest testing first, but that I would put a message in. Please advise. Thank you   ----- Message -----  From: Fay Sevilla  Sent: 7/27/2022   8:40 AM CDT  To: Maria Elena [facility] Staff  Subject: medical advice                                   Type:  Needs Medical Advice    Who Called: na  Symptoms (please be specific): sinuses    How long has patient had these symptoms:  lifetime  Pharmacy name and phone #:  na  Would the patient rather a call back or a response via [facility]? yes  Best Call Back Number: [phone]  Additional Information: pt requesting call from nurse for a zpack

## 2022-07-27 NOTE — PROGRESS NOTES
Subjective:      Patient ID: Ledy Kim is a 67 y.o. White female      Chief Complaint: Follow-up (Chronic sinus issues, Requesting zpack)     This is a telemedicine note. Patient was treated using telemedicine, real-time audio and video. EZRA, distant provider, conducted the visit from location identified below. The patient participated in the visit at a non- [facility] location selected by the patient identified below. I am licensed in the state where the patient stated they are located. The patient stated that they understood and accepted the privacy and security risks to their information at their location.  Patient was located in vehicle.  EZRA, distant provider, was located at [facility]      Past Medical History:   Diagnosis Date    HLD (hyperlipidemia)     Hypothyroidism, unspecified     Postmenopause         HPI  Follow-up  Associated symptoms include coughing. Pertinent negatives include no chest pain, chills, fever, headaches, myalgias, rash or sore throat.   Cough  This is a new problem. The current episode started in the past 7 days. The problem has been waxing and waning. The problem occurs every few hours. The cough is productive of sputum. Associated symptoms include nasal congestion, postnasal drip and rhinorrhea. Pertinent negatives include no chest pain, chills, ear congestion, ear pain, fever, headaches, heartburn, hemoptysis, myalgias, rash, sore throat, shortness of breath, sweats, weight loss or wheezing. The symptoms are aggravated by pollens. Risk factors for lung disease include smoking/tobacco exposure. She has tried rest for the symptoms. The treatment provided mild relief. There is no history of asthma, bronchiectasis, bronchitis, COPD, emphysema, environmental allergies or pneumonia.   Patient is requesting Z-yin; states she gets symptoms yearly, with relief with Z-yin.  Denies any other problems.     Review of Systems   Constitutional: Negative for chills, fever and weight  loss.   HENT: Positive for postnasal drip and rhinorrhea. Negative for ear pain and sore throat.    Respiratory: Positive for cough. Negative for hemoptysis, shortness of breath and wheezing.    Cardiovascular: Negative for chest pain.   Gastrointestinal: Negative for heartburn.   Musculoskeletal: Negative for myalgias.   Integumentary:  Negative for rash.   Allergic/Immunologic: Negative for environmental allergies.   Neurological: Negative for headaches.          Objective:      There were no vitals filed for this visit.   Body mass index is 28.29 kg/m².     Physical Exam  Constitutional:       Appearance: Normal appearance.   HENT:      Head: Normocephalic.   Pulmonary:      Effort: Pulmonary effort is normal. No respiratory distress.   Neurological:      Mental Status: She is alert.   Psychiatric:         Mood and Affect: Mood normal.         Behavior: Behavior normal.            Current Outpatient Medications:     atorvastatin (LIPITOR) 10 MG tablet, Take 1 tablet (10 mg total) by mouth once daily., Disp: 90 tablet, Rfl: 3    levothyroxine (SYNTHROID) 88 MCG tablet, Take 1 tablet (88 mcg total) by mouth once daily., Disp: 90 tablet, Rfl: 3    metoprolol succinate (TOPROL-XL) 50 MG 24 hr tablet, Take 1 tablet (50 mg total) by mouth once daily., Disp: 90 tablet, Rfl: 3    azithromycin (Z-MIGDALIA) 250 MG tablet, Take 2 tablets by mouth on day 1; Take 1 tablet by mouth on days 2-5, Disp: 6 tablet, Rfl: 0    benzonatate (TESSALON) 100 MG capsule, Take 1 capsule (100 mg total) by mouth 3 (three) times daily as needed for Cough., Disp: 30 capsule, Rfl: 0    Assessment & Plan:     Problem List Items Addressed This Visit        ENT    Sinusitis - Primary     Discussion made regarding Covid-19; declines testing  Rx Z-migdalia  Rx Tessalon Perles prn  F/U if no improvement  Report to ED for any CP, SOB, and/or worsening symptoms  Verbalizes understanding           Relevant Medications    azithromycin (Z-MIGDALIA) 250 MG tablet     benzonatate (TESSALON) 100 MG capsule

## 2022-09-05 PROBLEM — Z00.00 WELLNESS EXAMINATION: Status: RESOLVED | Noted: 2022-06-06 | Resolved: 2022-09-05

## 2023-05-22 RX ORDER — ATORVASTATIN CALCIUM 10 MG/1
TABLET, FILM COATED ORAL
Qty: 90 TABLET | Refills: 0 | Status: SHIPPED | OUTPATIENT
Start: 2023-05-22 | End: 2023-10-16

## 2023-05-22 RX ORDER — METOPROLOL SUCCINATE 50 MG/1
TABLET, EXTENDED RELEASE ORAL
Qty: 90 TABLET | Refills: 0 | Status: SHIPPED | OUTPATIENT
Start: 2023-05-22 | End: 2023-10-16

## 2023-05-22 RX ORDER — LEVOTHYROXINE SODIUM 88 UG/1
TABLET ORAL
Qty: 90 TABLET | Refills: 0 | Status: SHIPPED | OUTPATIENT
Start: 2023-05-22 | End: 2023-10-16

## 2023-06-05 ENCOUNTER — LAB VISIT (OUTPATIENT)
Dept: LAB | Facility: HOSPITAL | Age: 68
End: 2023-06-05
Attending: FAMILY MEDICINE
Payer: MEDICARE

## 2023-06-05 DIAGNOSIS — Z12.31 BREAST CANCER SCREENING BY MAMMOGRAM: ICD-10-CM

## 2023-06-05 DIAGNOSIS — F17.210 CIGARETTE SMOKER: ICD-10-CM

## 2023-06-05 DIAGNOSIS — R00.2 PALPITATIONS: ICD-10-CM

## 2023-06-05 DIAGNOSIS — Z00.00 WELLNESS EXAMINATION: ICD-10-CM

## 2023-06-05 DIAGNOSIS — Z78.0 POSTMENOPAUSAL: ICD-10-CM

## 2023-06-05 DIAGNOSIS — Z12.11 COLON CANCER SCREENING: ICD-10-CM

## 2023-06-05 DIAGNOSIS — E03.9 HYPOTHYROIDISM, UNSPECIFIED TYPE: ICD-10-CM

## 2023-06-05 DIAGNOSIS — E78.5 HYPERLIPIDEMIA, UNSPECIFIED HYPERLIPIDEMIA TYPE: ICD-10-CM

## 2023-06-05 LAB
APPEARANCE UR: ABNORMAL
BACTERIA #/AREA URNS AUTO: ABNORMAL /HPF
BILIRUB UR QL STRIP.AUTO: NEGATIVE MG/DL
COLOR UR: YELLOW
GLUCOSE UR QL STRIP.AUTO: NEGATIVE MG/DL
KETONES UR QL STRIP.AUTO: NEGATIVE MG/DL
LEUKOCYTE ESTERASE UR QL STRIP.AUTO: ABNORMAL UNIT/L
MUCOUS THREADS URNS QL MICRO: ABNORMAL /LPF
NITRITE UR QL STRIP.AUTO: NEGATIVE
PH UR STRIP.AUTO: 6 [PH]
PROT UR QL STRIP.AUTO: NEGATIVE MG/DL
RBC #/AREA URNS AUTO: ABNORMAL /HPF
RBC UR QL AUTO: ABNORMAL UNIT/L
SP GR UR STRIP.AUTO: 1.02
SQUAMOUS #/AREA URNS AUTO: ABNORMAL /HPF
UROBILINOGEN UR STRIP-ACNC: 0.2 MG/DL
WBC #/AREA URNS AUTO: ABNORMAL /HPF

## 2023-06-05 PROCEDURE — 81001 URINALYSIS AUTO W/SCOPE: CPT

## 2023-06-07 ENCOUNTER — OFFICE VISIT (OUTPATIENT)
Dept: FAMILY MEDICINE | Facility: CLINIC | Age: 68
End: 2023-06-07
Payer: MEDICARE

## 2023-06-07 VITALS
TEMPERATURE: 99 F | HEART RATE: 63 BPM | OXYGEN SATURATION: 100 % | BODY MASS INDEX: 26.71 KG/M2 | RESPIRATION RATE: 18 BRPM | WEIGHT: 160.31 LBS | HEIGHT: 65 IN | SYSTOLIC BLOOD PRESSURE: 109 MMHG | DIASTOLIC BLOOD PRESSURE: 70 MMHG

## 2023-06-07 DIAGNOSIS — F17.210 CIGARETTE SMOKER: ICD-10-CM

## 2023-06-07 DIAGNOSIS — Z78.0 POSTMENOPAUSAL: ICD-10-CM

## 2023-06-07 DIAGNOSIS — Z71.89 ADVANCED CARE PLANNING/COUNSELING DISCUSSION: ICD-10-CM

## 2023-06-07 DIAGNOSIS — M85.80 OSTEOPENIA, UNSPECIFIED LOCATION: ICD-10-CM

## 2023-06-07 DIAGNOSIS — R00.2 PALPITATIONS: ICD-10-CM

## 2023-06-07 DIAGNOSIS — E78.2 MIXED HYPERLIPIDEMIA: ICD-10-CM

## 2023-06-07 DIAGNOSIS — E03.9 HYPOTHYROIDISM, UNSPECIFIED TYPE: ICD-10-CM

## 2023-06-07 DIAGNOSIS — Z12.31 BREAST CANCER SCREENING BY MAMMOGRAM: ICD-10-CM

## 2023-06-07 DIAGNOSIS — Z00.00 WELLNESS EXAMINATION: Primary | ICD-10-CM

## 2023-06-07 DIAGNOSIS — Z12.11 COLON CANCER SCREENING: ICD-10-CM

## 2023-06-07 PROBLEM — J32.9 SINUSITIS: Status: RESOLVED | Noted: 2022-07-27 | Resolved: 2023-06-07

## 2023-06-07 PROBLEM — H61.20 CERUMEN IMPACTION: Status: RESOLVED | Noted: 2022-06-28 | Resolved: 2023-06-07

## 2023-06-07 PROCEDURE — 3288F PR FALLS RISK ASSESSMENT DOCUMENTED: ICD-10-PCS | Mod: CPTII,,, | Performed by: FAMILY MEDICINE

## 2023-06-07 PROCEDURE — 3288F FALL RISK ASSESSMENT DOCD: CPT | Mod: CPTII,,, | Performed by: FAMILY MEDICINE

## 2023-06-07 PROCEDURE — 1126F AMNT PAIN NOTED NONE PRSNT: CPT | Mod: CPTII,,, | Performed by: FAMILY MEDICINE

## 2023-06-07 PROCEDURE — 3078F DIAST BP <80 MM HG: CPT | Mod: CPTII,,, | Performed by: FAMILY MEDICINE

## 2023-06-07 PROCEDURE — 1159F PR MEDICATION LIST DOCUMENTED IN MEDICAL RECORD: ICD-10-PCS | Mod: CPTII,,, | Performed by: FAMILY MEDICINE

## 2023-06-07 PROCEDURE — 3008F PR BODY MASS INDEX (BMI) DOCUMENTED: ICD-10-PCS | Mod: CPTII,,, | Performed by: FAMILY MEDICINE

## 2023-06-07 PROCEDURE — G0402 PR WELCOME MEDICARE PREVENTIVE VISIT NEW ENROLLEE: ICD-10-PCS | Mod: ,,, | Performed by: FAMILY MEDICINE

## 2023-06-07 PROCEDURE — 1160F PR REVIEW ALL MEDS BY PRESCRIBER/CLIN PHARMACIST DOCUMENTED: ICD-10-PCS | Mod: CPTII,,, | Performed by: FAMILY MEDICINE

## 2023-06-07 PROCEDURE — 1159F MED LIST DOCD IN RCRD: CPT | Mod: CPTII,,, | Performed by: FAMILY MEDICINE

## 2023-06-07 PROCEDURE — 3074F SYST BP LT 130 MM HG: CPT | Mod: CPTII,,, | Performed by: FAMILY MEDICINE

## 2023-06-07 PROCEDURE — 1160F RVW MEDS BY RX/DR IN RCRD: CPT | Mod: CPTII,,, | Performed by: FAMILY MEDICINE

## 2023-06-07 PROCEDURE — 1126F PR PAIN SEVERITY QUANTIFIED, NO PAIN PRESENT: ICD-10-PCS | Mod: CPTII,,, | Performed by: FAMILY MEDICINE

## 2023-06-07 PROCEDURE — G0402 INITIAL PREVENTIVE EXAM: HCPCS | Mod: ,,, | Performed by: FAMILY MEDICINE

## 2023-06-07 PROCEDURE — 3008F BODY MASS INDEX DOCD: CPT | Mod: CPTII,,, | Performed by: FAMILY MEDICINE

## 2023-06-07 PROCEDURE — 3074F PR MOST RECENT SYSTOLIC BLOOD PRESSURE < 130 MM HG: ICD-10-PCS | Mod: CPTII,,, | Performed by: FAMILY MEDICINE

## 2023-06-07 PROCEDURE — 1101F PR PT FALLS ASSESS DOC 0-1 FALLS W/OUT INJ PAST YR: ICD-10-PCS | Mod: CPTII,,, | Performed by: FAMILY MEDICINE

## 2023-06-07 PROCEDURE — 3078F PR MOST RECENT DIASTOLIC BLOOD PRESSURE < 80 MM HG: ICD-10-PCS | Mod: CPTII,,, | Performed by: FAMILY MEDICINE

## 2023-06-07 PROCEDURE — 1101F PT FALLS ASSESS-DOCD LE1/YR: CPT | Mod: CPTII,,, | Performed by: FAMILY MEDICINE

## 2023-06-07 NOTE — ASSESSMENT & PLAN NOTE
Previously done labs reviewed with patient at time of visit  Pap 6/2021 normal and HPV negative  Mammogram 12/2022  dexa 6/2021 showed osteopenia. Repeat ordered  cologuard 6/2022    Advanced care planning discussed and paperwork given

## 2023-06-07 NOTE — ASSESSMENT & PLAN NOTE
Lab Results   Component Value Date    TSH 1.616 06/05/2023     Continue on current prescription meds

## 2023-06-07 NOTE — ASSESSMENT & PLAN NOTE
dexa 7/2021 showed osteopenia. Repeat due 7/2023. Will plan to do 12/2023 with mmg.  On calcium and vit d. ordered

## 2023-06-07 NOTE — PROGRESS NOTES
Patient ID: [id number]     Chief Complaint: Medicare AWV (Wellness )      HPI:     Ledy Kim is a 67 y.o. female here today for a Medicare Wellness. No other complaints today.     Patient presents to clinic unaccompanied for her wellness visit.  She did labs prior to her appt and it was reviewed with patient at time of appt today.     She has HLD and palpitations.  She is on atorvastatin and metoprolol.     She has hypothyroidism.  tsh is wnl.  On synthroid 88mcg     Mammogram at [facility] 12/2022.  Last dexa 7/2021 at [facility] showed osteopenia.  Last pap 6/2021 is normal and HPV negative.  Cologuard 6/2022.     She is smoker.  Working on cutting back        No past medical history on file.     History reviewed. No pertinent surgical history.    Review of patient's allergies indicates:   Allergen Reactions    Codeine Itching       Outpatient Medications Marked as Taking for the 6/7/23 encounter (Office Visit) with Peri Arredondo MD   Medication Sig Dispense Refill    atorvastatin (LIPITOR) 10 MG tablet TAKE 1 TABLET ONE TIME DAILY 90 tablet 0    levothyroxine (SYNTHROID) 88 MCG tablet TAKE 1 TABLET ONE TIME DAILY 90 tablet 0    metoprolol succinate (TOPROL-XL) 50 MG 24 hr tablet TAKE 1 TABLET ONE TIME DAILY 90 tablet 0       Social History     Socioeconomic History    Marital status:    Tobacco Use    Smoking status: Every Day     Packs/day: 1.00     Years: 15.00     Pack years: 15.00     Types: Cigarettes    Smokeless tobacco: Never   Substance and Sexual Activity    Alcohol use: Never    Drug use: Never    Sexual activity: Not Currently     Partners: Male        History reviewed. No pertinent family history.     Patient Care Team:  Peri Arredondo MD as PCP - General (Family Medicine)       Subjective:     Review of Systems   Constitutional: Negative.    HENT: Negative.     Eyes: Negative.    Respiratory: Negative.     Cardiovascular: Negative.    Gastrointestinal: Negative.    Genitourinary: Negative.   "  Musculoskeletal: Negative.    Skin: Negative.    Neurological: Negative.    Endo/Heme/Allergies: Negative.    Psychiatric/Behavioral: Negative.         Patient Reported Health Risk Assessment  What [age] 65-71  Are you male or female?: Female  During the past four weeks, how much have you been bothered by emotional problems such as feeling anxious, depressed, irritable, sad, or downhearted and blue?: Not at all  During the past five weeks, has your physical and/or emotional health limited your social activities with family, friends, neighbors, or groups?: Not at all  During the past four weeks, how much bodily pain have you generally had?: No pain  During the past four weeks, was someone available to help if you needed and wanted help?: No, not at all    Objective:     /70 (BP Location: Left arm)   Pulse 63   Temp 98.7 °F (37.1 °C) (Temporal)   Resp 18   Ht 5' 5" (1.651 m)   Wt 72.7 kg (160 lb 4.8 oz)   SpO2 100%   BMI 26.68 kg/m²     Physical Exam  Vitals and nursing note reviewed.   Constitutional:       Appearance: Normal appearance. She is normal weight.   HENT:      Head: Normocephalic and atraumatic.      Nose: Nose normal.      Mouth/Throat:      Mouth: Mucous membranes are moist.      Pharynx: Oropharynx is clear.   Eyes:      Extraocular Movements: Extraocular movements intact.   Cardiovascular:      Rate and Rhythm: Normal rate and regular rhythm.      Pulses: Normal pulses.      Heart sounds: Normal heart sounds.   Pulmonary:      Effort: Pulmonary effort is normal.      Breath sounds: Normal breath sounds.   Musculoskeletal:         General: Normal range of motion.      Cervical back: Normal range of motion.   Skin:     General: Skin is warm and dry.   Neurological:      General: No focal deficit present.      Mental Status: She is alert and oriented to person, place, and time. Mental status is at baseline.   Psychiatric:         Mood and Affect: Mood normal.         No flowsheet " "data found.  Fall Risk Assessment - Outpatient 6/7/2023 7/27/2022 6/28/2022 6/6/2022   Mobility Status Ambulatory Ambulatory Ambulatory Ambulatory   Number of falls 0 0 0 0   Identified as fall risk 0 0 0 0           Depression Screening  Over the past two weeks, has the patient felt down, depressed, or hopeless?: No  Over the past two weeks, has the patient felt little interest or pleasure in doing things?: No  Functional Ability/Safety Screening  Was the patient's timed Up & Go test unsteady or longer than 30 seconds?: No  Does the patient need help with phone, transportation, shopping, preparing meals, housework, laundry, meds, or managing money?: No  Does the patient's home have rugs in the hallway, lack grab bars in the bathroom, lack handrails on the stairs or have poor lighting?: No  Have you noticed any hearing difficulties?: No  A "Yes" response to any of the above questions should trigger further investigation.: n  Cognitive Function (Assessed through direct observation with due consideration of information obtained by way of patient reports and/or concerns raised by family, friends, caretakers, or others)    Does the patient repeat questions/statements in the same day?: No  Does the patient have trouble remembering the date, year, and time?: No  Does the patient have difficulty managing finances?: No  Does the patient have a decreased sense of direction?: No  Assessment/Plan:       Medicare Annual Wellness and Personalized Prevention Plan:   Fall Risk + Home Safety + Hearing Impairment + Depression Screen + Cognitive Impairment Screen + Health Risk Assessment all reviewed.     Opioid Screening: Patient medication list reviewed, patient is not taking prescription opioids. Patient is not using additional opioids than prescribed. Patient is at low risk of substance abuse based on this opioid use history.         Health Maintenance Topics with due status: Not Due       Topic Last Completion Date    Influenza " Vaccine 10/28/2020    DEXA Scan 07/09/2021    Hemoglobin A1c (Diabetic Prevention Screening) 06/02/2022    Colorectal Cancer Screening 06/20/2022    Mammogram 12/01/2022    Lipid Panel 06/05/2023      The patient's Health Maintenance was reviewed and the following appears to be due at this time:   Health Maintenance Due   Topic Date Due    Hepatitis C Screening  Never done    HIV Screening  Never done    COVID-19 Vaccine (3 - Pfizer series) 10/19/2021         1. Wellness examination  Assessment & Plan:  Previously done labs reviewed with patient at time of visit  Pap 6/2021 normal and HPV negative  Mammogram 12/2022  dexa 6/2021 showed osteopenia. Repeat ordered  cologuard 6/2022    Advanced care planning discussed and paperwork given        Orders:  -     CBC Auto Differential; Future; Expected date: 06/07/2024  -     Comprehensive Metabolic Panel; Future; Expected date: 06/07/2024  -     Lipid Panel; Future; Expected date: 06/07/2024  -     TSH; Future; Expected date: 06/07/2024  -     Urinalysis; Future; Expected date: 06/07/2024  -     Hepatitis C Antibody; Future; Expected date: 06/07/2024  -     HIV 1/2 Ag/Ab (4th Gen); Future; Expected date: 06/07/2024    2. Advanced care planning/counseling discussion  Assessment & Plan:  Advanced care planning discussed and paperwork given.    I attest that I have had a face to face discussion with patient and or surrogate decision maker.   Included surrogate decision maker: NO  Advanced directive in chart: NO  LAPOST: NO    Total time spent: 16 minutes      Orders:  -     CBC Auto Differential; Future; Expected date: 06/07/2024  -     Comprehensive Metabolic Panel; Future; Expected date: 06/07/2024  -     Lipid Panel; Future; Expected date: 06/07/2024  -     TSH; Future; Expected date: 06/07/2024  -     Urinalysis; Future; Expected date: 06/07/2024  -     Hepatitis C Antibody; Future; Expected date: 06/07/2024  -     HIV 1/2 Ag/Ab (4th Gen); Future; Expected date:  06/07/2024    3. Mixed hyperlipidemia  Assessment & Plan:  On statin    Orders:  -     CBC Auto Differential; Future; Expected date: 06/07/2024  -     Comprehensive Metabolic Panel; Future; Expected date: 06/07/2024  -     Lipid Panel; Future; Expected date: 06/07/2024  -     TSH; Future; Expected date: 06/07/2024  -     Urinalysis; Future; Expected date: 06/07/2024  -     Hepatitis C Antibody; Future; Expected date: 06/07/2024  -     HIV 1/2 Ag/Ab (4th Gen); Future; Expected date: 06/07/2024    4. Palpitations  Assessment & Plan:  On metoprolol    Orders:  -     CBC Auto Differential; Future; Expected date: 06/07/2024  -     Comprehensive Metabolic Panel; Future; Expected date: 06/07/2024  -     Lipid Panel; Future; Expected date: 06/07/2024  -     TSH; Future; Expected date: 06/07/2024  -     Urinalysis; Future; Expected date: 06/07/2024  -     Hepatitis C Antibody; Future; Expected date: 06/07/2024  -     HIV 1/2 Ag/Ab (4th Gen); Future; Expected date: 06/07/2024    5. Hypothyroidism, unspecified type  Assessment & Plan:  Lab Results   Component Value Date    TSH 1.616 06/05/2023     Continue on current prescription meds    Orders:  -     CBC Auto Differential; Future; Expected date: 06/07/2024  -     Comprehensive Metabolic Panel; Future; Expected date: 06/07/2024  -     Lipid Panel; Future; Expected date: 06/07/2024  -     TSH; Future; Expected date: 06/07/2024  -     Urinalysis; Future; Expected date: 06/07/2024  -     Hepatitis C Antibody; Future; Expected date: 06/07/2024  -     HIV 1/2 Ag/Ab (4th Gen); Future; Expected date: 06/07/2024    6. Postmenopausal  Assessment & Plan:  dexa 7/2021 showed osteopenia. Repeat due 7/2023. Will plan to do 12/2023 with mmg.  On calcium and vit d. ordered    Orders:  -     CBC Auto Differential; Future; Expected date: 06/07/2024  -     Comprehensive Metabolic Panel; Future; Expected date: 06/07/2024  -     Lipid Panel; Future; Expected date: 06/07/2024  -     TSH; Future;  Expected date: 06/07/2024  -     Urinalysis; Future; Expected date: 06/07/2024  -     Hepatitis C Antibody; Future; Expected date: 06/07/2024  -     HIV 1/2 Ag/Ab (4th Gen); Future; Expected date: 06/07/2024  -     DXA Bone Density Axial Skeleton 1 or more sites; Future; Expected date: 12/07/2023    7. Osteopenia, unspecified location  Assessment & Plan:  See postmenopausal A&P    Orders:  -     CBC Auto Differential; Future; Expected date: 06/07/2024  -     Comprehensive Metabolic Panel; Future; Expected date: 06/07/2024  -     Lipid Panel; Future; Expected date: 06/07/2024  -     TSH; Future; Expected date: 06/07/2024  -     Urinalysis; Future; Expected date: 06/07/2024  -     Hepatitis C Antibody; Future; Expected date: 06/07/2024  -     HIV 1/2 Ag/Ab (4th Gen); Future; Expected date: 06/07/2024    8. Breast cancer screening by mammogram  Assessment & Plan:  mm 12/2022 at [facility]. Repeat ordered    Orders:  -     CBC Auto Differential; Future; Expected date: 06/07/2024  -     Comprehensive Metabolic Panel; Future; Expected date: 06/07/2024  -     Lipid Panel; Future; Expected date: 06/07/2024  -     TSH; Future; Expected date: 06/07/2024  -     Urinalysis; Future; Expected date: 06/07/2024  -     Hepatitis C Antibody; Future; Expected date: 06/07/2024  -     HIV 1/2 Ag/Ab (4th Gen); Future; Expected date: 06/07/2024  -     Mammo Digital Screening Bilat w/ Gab; Future; Expected date: 12/07/2023    9. [facility] cancer screening  [facility]    Orders:  -     CBC Auto Differential; Future; Expected date: 06/07/2024  -     Comprehensive Metabolic Panel; Future; Expected date: 06/07/2024  -     Lipid Panel; Future; Expected date: 06/07/2024  -     TSH; Future; Expected date: 06/07/2024  -     Urinalysis; Future; Expected date: 06/07/2024  -     Hepatitis C Antibody; Future; Expected date: 06/07/2024  -     HIV 1/2 Ag/Ab (4th Gen); Future; Expected date: 06/07/2024    10. Cigarette  smoker  Assessment & Plan:  Referral to smoking cessation placed.    Orders:  -     CBC Auto Differential; Future; Expected date: 06/07/2024  -     Comprehensive Metabolic Panel; Future; Expected date: 06/07/2024  -     Lipid Panel; Future; Expected date: 06/07/2024  -     TSH; Future; Expected date: 06/07/2024  -     Urinalysis; Future; Expected date: 06/07/2024  -     Hepatitis C Antibody; Future; Expected date: 06/07/2024  -     HIV 1/2 Ag/Ab (4th Gen); Future; Expected date: 06/07/2024  -     Ambulatory referral/consult to Smoking Cessation Program; Future; Expected date: 06/14/2023         Advance Care Planning   I attest to discussing Advance Care Planning with patient and/or family member.  Education was provided including the importance of the Health Care Power of , Advance Directives, and/or [place] documentation.  The patient expressed understanding to the importance of this information and discussion.  Length of ACP conversation in minutes: 16         Medication List with Changes/Refills   Current Medications    ATORVASTATIN (LIPITOR) 10 MG TABLET    TAKE 1 TABLET ONE TIME DAILY       Start Date: 5/22/2023 End Date: --    LEVOTHYROXINE (SYNTHROID) 88 MCG TABLET    TAKE 1 TABLET ONE TIME DAILY       Start Date: 5/22/2023 End Date: --    METOPROLOL SUCCINATE (TOPROL-XL) 50 MG 24 HR TABLET    TAKE 1 TABLET ONE TIME DAILY       Start Date: 5/22/2023 End Date: --        Follow up in about 1 year (around 6/7/2024) for Medicare Wellness with labs. In addition to their scheduled follow up, the patient has also been instructed to follow up on as needed basis.

## 2023-09-07 ENCOUNTER — PATIENT MESSAGE (OUTPATIENT)
Dept: RESEARCH | Facility: HOSPITAL | Age: 68
End: 2023-09-07
Payer: MEDICARE

## 2023-09-11 PROBLEM — Z00.00 WELLNESS EXAMINATION: Status: RESOLVED | Noted: 2022-06-06 | Resolved: 2023-09-11

## 2023-10-16 RX ORDER — LEVOTHYROXINE SODIUM 88 UG/1
88 TABLET ORAL
Qty: 90 TABLET | Refills: 10 | Status: SHIPPED | OUTPATIENT
Start: 2023-10-16

## 2023-10-16 RX ORDER — METOPROLOL SUCCINATE 50 MG/1
50 TABLET, EXTENDED RELEASE ORAL
Qty: 90 TABLET | Refills: 10 | Status: SHIPPED | OUTPATIENT
Start: 2023-10-16

## 2023-10-16 RX ORDER — ATORVASTATIN CALCIUM 10 MG/1
10 TABLET, FILM COATED ORAL
Qty: 90 TABLET | Refills: 10 | Status: SHIPPED | OUTPATIENT
Start: 2023-10-16

## 2024-06-04 ENCOUNTER — TELEPHONE (OUTPATIENT)
Dept: FAMILY MEDICINE | Facility: CLINIC | Age: 69
End: 2024-06-04
Payer: MEDICARE

## 2024-06-05 ENCOUNTER — LAB VISIT (OUTPATIENT)
Dept: LAB | Facility: HOSPITAL | Age: 69
End: 2024-06-05
Attending: FAMILY MEDICINE
Payer: MEDICARE

## 2024-06-05 DIAGNOSIS — Z12.11 COLON CANCER SCREENING: ICD-10-CM

## 2024-06-05 DIAGNOSIS — Z71.89 ADVANCED CARE PLANNING/COUNSELING DISCUSSION: ICD-10-CM

## 2024-06-05 DIAGNOSIS — R00.2 PALPITATIONS: ICD-10-CM

## 2024-06-05 DIAGNOSIS — F17.210 CIGARETTE SMOKER: ICD-10-CM

## 2024-06-05 DIAGNOSIS — E03.9 HYPOTHYROIDISM, UNSPECIFIED TYPE: ICD-10-CM

## 2024-06-05 DIAGNOSIS — Z00.00 WELLNESS EXAMINATION: ICD-10-CM

## 2024-06-05 DIAGNOSIS — Z12.31 BREAST CANCER SCREENING BY MAMMOGRAM: ICD-10-CM

## 2024-06-05 DIAGNOSIS — M85.80 OSTEOPENIA, UNSPECIFIED LOCATION: ICD-10-CM

## 2024-06-05 DIAGNOSIS — E78.2 MIXED HYPERLIPIDEMIA: ICD-10-CM

## 2024-06-05 DIAGNOSIS — Z78.0 POSTMENOPAUSAL: ICD-10-CM

## 2024-06-05 LAB
ALBUMIN SERPL-MCNC: 3.7 G/DL (ref 3.4–4.8)
ALBUMIN/GLOB SERPL: 1.1 RATIO (ref 1.1–2)
ALP SERPL-CCNC: 94 UNIT/L (ref 40–150)
ALT SERPL-CCNC: 18 UNIT/L (ref 0–55)
ANION GAP SERPL CALC-SCNC: 8 MEQ/L
AST SERPL-CCNC: 21 UNIT/L (ref 5–34)
BASOPHILS # BLD AUTO: 0.04 X10(3)/MCL
BASOPHILS NFR BLD AUTO: 0.5 %
BILIRUB SERPL-MCNC: 0.6 MG/DL
BUN SERPL-MCNC: 13.3 MG/DL (ref 9.8–20.1)
CALCIUM SERPL-MCNC: 9.9 MG/DL (ref 8.4–10.2)
CHLORIDE SERPL-SCNC: 107 MMOL/L (ref 98–107)
CHOLEST SERPL-MCNC: 142 MG/DL
CHOLEST/HDLC SERPL: 3 {RATIO} (ref 0–5)
CO2 SERPL-SCNC: 27 MMOL/L (ref 23–31)
CREAT SERPL-MCNC: 0.82 MG/DL (ref 0.55–1.02)
CREAT/UREA NIT SERPL: 16
EOSINOPHIL # BLD AUTO: 0.26 X10(3)/MCL (ref 0–0.9)
EOSINOPHIL NFR BLD AUTO: 3.3 %
ERYTHROCYTE [DISTWIDTH] IN BLOOD BY AUTOMATED COUNT: 12.7 % (ref 11.5–17)
GFR SERPLBLD CREATININE-BSD FMLA CKD-EPI: >60 ML/MIN/1.73/M2
GLOBULIN SER-MCNC: 3.4 GM/DL (ref 2.4–3.5)
GLUCOSE SERPL-MCNC: 98 MG/DL (ref 82–115)
HCT VFR BLD AUTO: 46.8 % (ref 37–47)
HCV AB SERPL QL IA: NONREACTIVE
HDLC SERPL-MCNC: 43 MG/DL (ref 35–60)
HGB BLD-MCNC: 15.5 G/DL (ref 12–16)
HIV 1+2 AB+HIV1 P24 AG SERPL QL IA: NONREACTIVE
IMM GRANULOCYTES # BLD AUTO: 0.01 X10(3)/MCL (ref 0–0.04)
IMM GRANULOCYTES NFR BLD AUTO: 0.1 %
LDLC SERPL CALC-MCNC: 74 MG/DL (ref 50–140)
LYMPHOCYTES # BLD AUTO: 4.08 X10(3)/MCL (ref 0.6–4.6)
LYMPHOCYTES NFR BLD AUTO: 52 %
MCH RBC QN AUTO: 30.4 PG (ref 27–31)
MCHC RBC AUTO-ENTMCNC: 33.1 G/DL (ref 33–36)
MCV RBC AUTO: 91.8 FL (ref 80–94)
MONOCYTES # BLD AUTO: 0.6 X10(3)/MCL (ref 0.1–1.3)
MONOCYTES NFR BLD AUTO: 7.6 %
NEUTROPHILS # BLD AUTO: 2.86 X10(3)/MCL (ref 2.1–9.2)
NEUTROPHILS NFR BLD AUTO: 36.5 %
NRBC BLD AUTO-RTO: 0 %
PLATELET # BLD AUTO: 223 X10(3)/MCL (ref 130–400)
PMV BLD AUTO: 10.8 FL (ref 7.4–10.4)
POTASSIUM SERPL-SCNC: 4.7 MMOL/L (ref 3.5–5.1)
PROT SERPL-MCNC: 7.1 GM/DL (ref 5.8–7.6)
RBC # BLD AUTO: 5.1 X10(6)/MCL (ref 4.2–5.4)
SODIUM SERPL-SCNC: 142 MMOL/L (ref 136–145)
TRIGL SERPL-MCNC: 123 MG/DL (ref 37–140)
TSH SERPL-ACNC: 1.57 UIU/ML (ref 0.35–4.94)
VLDLC SERPL CALC-MCNC: 25 MG/DL
WBC # SPEC AUTO: 7.85 X10(3)/MCL (ref 4.5–11.5)

## 2024-06-05 PROCEDURE — 86803 HEPATITIS C AB TEST: CPT

## 2024-06-05 PROCEDURE — 80053 COMPREHEN METABOLIC PANEL: CPT

## 2024-06-05 PROCEDURE — 85025 COMPLETE CBC W/AUTO DIFF WBC: CPT

## 2024-06-05 PROCEDURE — 87389 HIV-1 AG W/HIV-1&-2 AB AG IA: CPT

## 2024-06-05 PROCEDURE — 80061 LIPID PANEL: CPT

## 2024-06-05 PROCEDURE — 84443 ASSAY THYROID STIM HORMONE: CPT

## 2024-06-05 PROCEDURE — 36415 COLL VENOUS BLD VENIPUNCTURE: CPT

## 2024-06-11 NOTE — PROGRESS NOTES
Patient ID: [id number]     Chief Complaint: Medicare AWV (Wellness )      HPI:     Ledy Kim is a 68 y.o. female here today for a Medicare Wellness. No other complaints today.     Patient presents to clinic unaccompanied for her wellness visit.  She did labs prior to her appt and it was reviewed with patient at time of appt today.  She is feeling well.  Denies complaints.      She has HLD and palpitations.  She is on atorvastatin and metoprolol.     She has hypothyroidism.  On synthroid 88mcg. Tsh 6/2024 wnl     Mammogram at [facility] 12/2022.  Last dexa 7/2021 at [facility] showed osteopenia.  Last pap 6/2021 is normal and HPV negative.  declines repeat pap today.  Cologuard 6/2022.     She is smoker.  Working on cutting back    She is allergic to codeine.         No past medical history on file.     History reviewed. No pertinent surgical history.    Review of patient's allergies indicates:   Allergen Reactions    Codeine Itching       Outpatient Medications Marked as Taking for the 6/12/24 encounter (Office Visit) with Peri Arredondo MD   Medication Sig Dispense Refill    [DISCONTINUED] atorvastatin (LIPITOR) 10 MG tablet TAKE 1 TABLET EVERY DAY 90 tablet 10    [DISCONTINUED] levothyroxine (SYNTHROID) 88 MCG tablet TAKE 1 TABLET EVERY DAY 90 tablet 10    [DISCONTINUED] metoprolol succinate (TOPROL-XL) 50 MG 24 hr tablet TAKE 1 TABLET EVERY DAY 90 tablet 10       Social History     Socioeconomic History    Marital status:    Tobacco Use    Smoking status: Every Day     Current packs/day: 1.00     Average packs/day: 1 pack/day for 15.0 years (15.0 ttl pk-yrs)     Types: Cigarettes    Smokeless tobacco: Never   Substance and Sexual Activity    Alcohol use: Never    Drug use: Never    Sexual activity: Not Currently     Partners: Male        No family history on file.     Patient Care Team:  Peri Arredondo MD as PCP - General (Family Medicine)       Subjective:     Review of Systems   Constitutional:  Negative.    HENT: Negative.     Eyes: Negative.    Respiratory: Negative.     Cardiovascular: Negative.    Gastrointestinal: Negative.    Genitourinary: Negative.    Musculoskeletal: Negative.    Skin: Negative.    Neurological: Negative.    Endo/Heme/Allergies: Negative.    Psychiatric/Behavioral: Negative.           Patient Reported Health Risk Assessment  What [age] 65-69  Are you male or female?: Female  During the past four weeks, how much have you been bothered by emotional problems such as feeling anxious, depressed, irritable, sad, or downhearted and blue?: Not at all  During the past five weeks, has your physical and/or emotional health limited your social activities with family, friends, neighbors, or groups?: Not at all  During the past four weeks, how much bodily pain have you generally had?: No pain  During the past four weeks, was someone available to help if you needed and wanted help?: Yes, quite a bit  During the past four weeks, what was the hardest physical activity you could do for at least two minutes?: Light  Can you get to places out of walking distance without help?  (For example, can you travel alone on buses or taxis, or drive your own car?): Yes  Can you go shopping for groceries or clothes without someone's help?: Yes  Can you prepare your own meals?: Yes  Can you do your own housework without help?: Yes  Because of any health problems, do you need the help of another person with your personal care needs such as eating, bathing, dressing, or getting around the house?: No  Can you handle your own money without help?: Yes  During the past four weeks, how would you rate your health in general?: Good  How have things been going for you during the past four weeks?: Good and bad parts about equal  Are you having difficulties driving your car?: No  Do you always fasten your seat belt when you are in a car?: Yes, usually  How often in the past four weeks have you been bothered by falling  "or dizzy when standing up?: Never  How often in the past four weeks have you been bothered by sexual problems?: Never  How often in the past four weeks have you been bothered by trouble eating well?: Never  How often in the past four weeks have you been bothered by teeth or denture problems?: Never  How often in the past four weeks have you been bothered with problems using the telephone?: Never  How often in the past four weeks have you been bothered by tiredness or fatigue?: Never  Have you fallen two or more times in the past year?: No  Are you afraid of falling?: No  Are you a smoker?: Yes, I might quit  During the past four weeks, how many drinks of wine, beer, or other alcoholic beverages did you have?: No alcohol at all  Do you exercise for about 20 minutes three or more days a week?: Yes, some of the time  Have you been given any information to help you with hazards in your house that might hurt you?: No  Have you been given any information to help you with keeping track of your medications?: No  How often do you have trouble taking medicines the way you've been told to take them?: I always take them as prescribed  How confident are you that you can control and manage most of your health problems?: Very confident  What is your race? (Check all that apply.):     Objective:     /78 (BP Location: Left arm)   Pulse 60   Temp 98.3 °F (36.8 °C) (Temporal)   Resp 16   Ht 5' 5" (1.651 m)   Wt 73 kg (161 lb)   SpO2 98%   BMI 26.79 kg/m²     Physical Exam  Vitals and nursing note reviewed.   Constitutional:       Appearance: Normal appearance. She is normal weight.   HENT:      Head: Normocephalic and atraumatic.      Nose: Nose normal.      Mouth/Throat:      Mouth: Mucous membranes are moist.      Pharynx: Oropharynx is clear.   Eyes:      Extraocular Movements: Extraocular movements intact.   Cardiovascular:      Rate and Rhythm: Normal rate and regular rhythm.      Pulses: Normal pulses.      " Heart sounds: Normal heart sounds.   Pulmonary:      Effort: Pulmonary effort is normal.      Breath sounds: Normal breath sounds.   Musculoskeletal:         General: Normal range of motion.      Cervical back: Normal range of motion.   Skin:     General: Skin is warm and dry.   Neurological:      General: No focal deficit present.      Mental Status: She is alert and oriented to person, place, and time. Mental status is at baseline.   Psychiatric:         Mood and Affect: Mood normal.                No data to display                  6/12/2024     8:00 AM 6/7/2023     9:00 AM 7/27/2022     3:20 PM 6/28/2022     3:15 PM 6/6/2022     9:00 AM   Fall Risk Assessment - Outpatient   Mobility Status Ambulatory Ambulatory Ambulatory Ambulatory Ambulatory   Number of falls 0 0 0 0 0   Identified as fall risk False False False False False           Depression Screening  Over the past two weeks, has the patient felt down, depressed, or hopeless?: No  Over the past two weeks, has the patient felt little interest or pleasure in doing things?: No  Functional Ability/Safety Screening  Was the patient's timed Up & Go test unsteady or longer than 30 seconds?: No  Does the patient need help with phone, transportation, shopping, preparing meals, housework, laundry, meds, or managing money?: No  Does the patient's home have rugs in the hallway, lack grab bars in the bathroom, lack handrails on the stairs or have poor lighting?: No  Have you noticed any hearing difficulties?: No  Cognitive Function (Assessed through direct observation with due consideration of information obtained by way of patient reports and/or concerns raised by family, friends, caretakers, or others)    Does the patient repeat questions/statements in the same day?: No  Does the patient have trouble remembering the date, year, and time?: No  Does the patient have difficulty managing finances?: No  Does the patient have a decreased sense of direction?:  "No  Assessment/Plan:       Medicare Annual Wellness and Personalized Prevention Plan:   Fall Risk + Home Safety + Hearing Impairment + Depression Screen + Cognitive Impairment Screen + Health Risk Assessment all reviewed.     Opioid Screening: Patient medication list reviewed, patient is not taking prescription opioids. Patient is not using additional opioids than prescribed. Patient is at low risk of substance abuse based on this opioid use history.         Health Maintenance Topics with due status: Not Due       Topic Last Completion Date    Influenza Vaccine 10/28/2020    Hemoglobin A1c (Diabetic Prevention Screening) 06/02/2022    Colorectal Cancer Screening 06/20/2022    Lipid Panel 06/05/2024      The patient's Health Maintenance was reviewed and the following appears to be due at this time:   Health Maintenance Due   Topic Date Due    COVID-19 Vaccine (3 - 2023-24 season) 09/01/2023    Mammogram  12/01/2023    DEXA Scan  07/09/2024         1. Wellness examination  Assessment & Plan:  Previously done labs reviewed with patient at time of visit  Pap 6/2021 normal and HPV negative. Will plan at next appt  Mammogram 12/2022  dexa 6/2021 showed osteopenia. Repeat ordered  cologuard 6/2022    Advanced care planning discussed and paperwork given        Orders:  -     CBC Auto Differential; Future; Expected date: 06/12/2025  -     Comprehensive Metabolic Panel; Future; Expected date: 06/12/2025  -     Lipid Panel; Future; Expected date: 06/12/2025  -     TSH; Future; Expected date: 06/12/2025  -     Urinalysis; Future; Expected date: 06/12/2025    2. Mixed hyperlipidemia  Assessment & Plan:  On statin  Lab Results   Component Value Date    CHOL 142 06/05/2024    CHOL 145 06/05/2023    CHOL 144 06/02/2022     Lab Results   Component Value Date    HDL 43 06/05/2024    HDL 42 06/05/2023    HDL 46 06/02/2022     No results found for: "LDLCALC"  Lab Results   Component Value Date    TRIG 123 06/05/2024    TRIG 97 " "06/05/2023    TRIG 112 06/02/2022       No results found for: "CHOLHDL"      Orders:  -     CBC Auto Differential; Future; Expected date: 06/12/2025  -     Comprehensive Metabolic Panel; Future; Expected date: 06/12/2025  -     Lipid Panel; Future; Expected date: 06/12/2025  -     TSH; Future; Expected date: 06/12/2025  -     Urinalysis; Future; Expected date: 06/12/2025  -     atorvastatin (LIPITOR) 10 MG tablet; Take 1 tablet (10 mg total) by mouth every evening.  Dispense: 90 tablet; Refill: 3    3. Palpitations  Assessment & Plan:  On metoprolol    Orders:  -     CBC Auto Differential; Future; Expected date: 06/12/2025  -     Comprehensive Metabolic Panel; Future; Expected date: 06/12/2025  -     Lipid Panel; Future; Expected date: 06/12/2025  -     TSH; Future; Expected date: 06/12/2025  -     Urinalysis; Future; Expected date: 06/12/2025  -     metoprolol succinate (TOPROL-XL) 50 MG 24 hr tablet; Take 1 tablet (50 mg total) by mouth once daily.  Dispense: 90 tablet; Refill: 3    4. Hypothyroidism, unspecified type  Assessment & Plan:  Lab Results   Component Value Date    TSH 1.570 06/05/2024     Continue on current prescription meds    Orders:  -     CBC Auto Differential; Future; Expected date: 06/12/2025  -     Comprehensive Metabolic Panel; Future; Expected date: 06/12/2025  -     Lipid Panel; Future; Expected date: 06/12/2025  -     TSH; Future; Expected date: 06/12/2025  -     Urinalysis; Future; Expected date: 06/12/2025  -     levothyroxine (SYNTHROID) 88 MCG tablet; Take 1 tablet (88 mcg total) by mouth before breakfast.  Dispense: 90 tablet; Refill: 3    5. Advanced care planning/counseling discussion  Assessment & Plan:  Advanced care planning discussed and paperwork given.    I attest that I have had a face to face discussion with patient and or surrogate decision maker.   Included surrogate decision maker: NO  Advanced directive in chart: NO  LAPOST: NO    Total time spent: 16 " minutes      Orders:  -     CBC Auto Differential; Future; Expected date: 06/12/2025  -     Comprehensive Metabolic Panel; Future; Expected date: 06/12/2025  -     Lipid Panel; Future; Expected date: 06/12/2025  -     TSH; Future; Expected date: 06/12/2025  -     Urinalysis; Future; Expected date: 06/12/2025    6. Postmenopausal  Assessment & Plan:  dexa 7/2021 showed osteopenia. Repeat due 7/2023. Will plan to do 12/2023 with mmg.  On calcium and vit d. ordered    Orders:  -     CBC Auto Differential; Future; Expected date: 06/12/2025  -     Comprehensive Metabolic Panel; Future; Expected date: 06/12/2025  -     Lipid Panel; Future; Expected date: 06/12/2025  -     TSH; Future; Expected date: 06/12/2025  -     Urinalysis; Future; Expected date: 06/12/2025  -     DXA Bone Density Axial Skeleton 1 or more sites; Future; Expected date: 06/12/2024    7. Osteopenia, unspecified location  Assessment & Plan:  See postmenopausal A&P    Orders:  -     CBC Auto Differential; Future; Expected date: 06/12/2025  -     Comprehensive Metabolic Panel; Future; Expected date: 06/12/2025  -     Lipid Panel; Future; Expected date: 06/12/2025  -     TSH; Future; Expected date: 06/12/2025  -     Urinalysis; Future; Expected date: 06/12/2025    8. Breast cancer screening by mammogram  Assessment & Plan:  [facility] 12/[facility]22 at [facility]. Repeat ordered    Orders:  -     CBC Auto Differential; Future; Expected date: 06/12/2025  -     Comprehensive Metabolic Panel; Future; Expected date: 06/12/2025  -     Lipid Panel; Future; Expected date: 06/12/2025  -     TSH; Future; Expected date: 06/12/2025  -     Urinalysis; Future; Expected date: 06/12/2025  -     Mammo Digital Screening Bilat w/ Gab; Future; Expected date: 06/12/2024    9. Colon cancer screening  Assessment & Plan:  cologuard 6/2022    Orders:  -     CBC Auto Differential; Future; Expected date: 06/12/2025  -     Comprehensive Metabolic Panel; Future; Expected date: 06/12/2025  -     Lipid  Panel; Future; Expected date: 06/12/2025  -     TSH; Future; Expected date: 06/12/2025  -     Urinalysis; Future; Expected date: 06/12/2025    10. Cigarette smoker  Assessment & Plan:  Referral to smoking cessation placed.    Orders:  -     Ambulatory referral/consult to Smoking Cessation Program; Future; Expected date: 06/19/2024         Advance Care Planning   I attest to discussing Advance Care Planning with patient and/or family member.  Education was provided including the importance of the Health Care Power of , Advance Directives, and/or [place] documentation.  The patient expressed understanding to the importance of this information and discussion.  Length of ACP conversation in minutes: 16         Medication List with Changes/Refills   Changed and/or Refilled Medications    Modified Medication Previous Medication    ATORVASTATIN (LIPITOR) 10 MG TABLET atorvastatin (LIPITOR) 10 MG tablet       Take 1 tablet (10 mg total) by mouth every evening.    TAKE 1 TABLET EVERY DAY       Start Date: 6/12/2024 End Date: 6/12/2025    Start Date: 10/16/2023End Date: 6/12/2024    LEVOTHYROXINE (SYNTHROID) 88 MCG TABLET levothyroxine (SYNTHROID) 88 MCG tablet       Take 1 tablet (88 mcg total) by mouth before breakfast.    TAKE 1 TABLET EVERY DAY       Start Date: 6/12/2024 End Date: 6/12/2025    Start Date: 10/16/2023End Date: 6/12/2024    METOPROLOL SUCCINATE (TOPROL-XL) 50 MG 24 HR TABLET metoprolol succinate (TOPROL-XL) 50 MG 24 hr tablet       Take 1 tablet (50 mg total) by mouth once daily.    TAKE 1 TABLET EVERY DAY       Start Date: 6/12/2024 End Date: 6/12/2025    Start Date: 10/16/2023End Date: 6/12/2024        Follow up in about 1 year (around 6/12/2025) for Medicare Wellness with labs and pap. In addition to their scheduled follow up, the patient has also been instructed to follow up on as needed basis.

## 2024-06-12 ENCOUNTER — OFFICE VISIT (OUTPATIENT)
Dept: FAMILY MEDICINE | Facility: CLINIC | Age: 69
End: 2024-06-12
Payer: MEDICARE

## 2024-06-12 VITALS
TEMPERATURE: 98 F | SYSTOLIC BLOOD PRESSURE: 124 MMHG | OXYGEN SATURATION: 98 % | DIASTOLIC BLOOD PRESSURE: 78 MMHG | RESPIRATION RATE: 16 BRPM | HEIGHT: 65 IN | WEIGHT: 161 LBS | HEART RATE: 60 BPM | BODY MASS INDEX: 26.82 KG/M2

## 2024-06-12 DIAGNOSIS — Z12.11 COLON CANCER SCREENING: ICD-10-CM

## 2024-06-12 DIAGNOSIS — Z00.00 WELLNESS EXAMINATION: Primary | ICD-10-CM

## 2024-06-12 DIAGNOSIS — Z71.89 ADVANCED CARE PLANNING/COUNSELING DISCUSSION: ICD-10-CM

## 2024-06-12 DIAGNOSIS — Z78.0 POSTMENOPAUSAL: ICD-10-CM

## 2024-06-12 DIAGNOSIS — Z12.31 BREAST CANCER SCREENING BY MAMMOGRAM: ICD-10-CM

## 2024-06-12 DIAGNOSIS — R00.2 PALPITATIONS: ICD-10-CM

## 2024-06-12 DIAGNOSIS — F17.210 CIGARETTE SMOKER: ICD-10-CM

## 2024-06-12 DIAGNOSIS — E03.9 HYPOTHYROIDISM, UNSPECIFIED TYPE: ICD-10-CM

## 2024-06-12 DIAGNOSIS — M85.80 OSTEOPENIA, UNSPECIFIED LOCATION: ICD-10-CM

## 2024-06-12 DIAGNOSIS — E78.2 MIXED HYPERLIPIDEMIA: ICD-10-CM

## 2024-06-12 PROCEDURE — 3288F FALL RISK ASSESSMENT DOCD: CPT | Mod: CPTII,,, | Performed by: FAMILY MEDICINE

## 2024-06-12 PROCEDURE — 1101F PT FALLS ASSESS-DOCD LE1/YR: CPT | Mod: CPTII,,, | Performed by: FAMILY MEDICINE

## 2024-06-12 PROCEDURE — 3078F DIAST BP <80 MM HG: CPT | Mod: CPTII,,, | Performed by: FAMILY MEDICINE

## 2024-06-12 PROCEDURE — 1160F RVW MEDS BY RX/DR IN RCRD: CPT | Mod: CPTII,,, | Performed by: FAMILY MEDICINE

## 2024-06-12 PROCEDURE — G0439 PPPS, SUBSEQ VISIT: HCPCS | Mod: ,,, | Performed by: FAMILY MEDICINE

## 2024-06-12 PROCEDURE — 1159F MED LIST DOCD IN RCRD: CPT | Mod: CPTII,,, | Performed by: FAMILY MEDICINE

## 2024-06-12 PROCEDURE — 1126F AMNT PAIN NOTED NONE PRSNT: CPT | Mod: CPTII,,, | Performed by: FAMILY MEDICINE

## 2024-06-12 PROCEDURE — 3074F SYST BP LT 130 MM HG: CPT | Mod: CPTII,,, | Performed by: FAMILY MEDICINE

## 2024-06-12 RX ORDER — METOPROLOL SUCCINATE 50 MG/1
50 TABLET, EXTENDED RELEASE ORAL DAILY
Qty: 90 TABLET | Refills: 3 | Status: SHIPPED | OUTPATIENT
Start: 2024-06-12 | End: 2025-06-12

## 2024-06-12 RX ORDER — LEVOTHYROXINE SODIUM 88 UG/1
88 TABLET ORAL
Qty: 90 TABLET | Refills: 3 | Status: SHIPPED | OUTPATIENT
Start: 2024-06-12 | End: 2025-06-12

## 2024-06-12 RX ORDER — ATORVASTATIN CALCIUM 10 MG/1
10 TABLET, FILM COATED ORAL NIGHTLY
Qty: 90 TABLET | Refills: 3 | Status: SHIPPED | OUTPATIENT
Start: 2024-06-12 | End: 2025-06-12

## 2024-06-12 NOTE — ASSESSMENT & PLAN NOTE
Previously done labs reviewed with patient at time of visit  Pap 6/2021 normal and HPV negative. Will plan at next appt  Mammogram 12/2022  dexa 6/2021 showed osteopenia. Repeat ordered  cologuard 6/2022    Advanced care planning discussed and paperwork given

## 2024-06-12 NOTE — ASSESSMENT & PLAN NOTE
Lab Results   Component Value Date    TSH 1.570 06/05/2024     Continue on current prescription meds

## 2024-06-12 NOTE — ASSESSMENT & PLAN NOTE
"On statin  Lab Results   Component Value Date    CHOL 142 06/05/2024    CHOL 145 06/05/2023    CHOL 144 06/02/2022     Lab Results   Component Value Date    HDL 43 06/05/2024    HDL 42 06/05/2023    HDL 46 06/02/2022     No results found for: "LDLCALC"  Lab Results   Component Value Date    TRIG 123 06/05/2024    TRIG 97 06/05/2023    TRIG 112 06/02/2022       No results found for: "CHOLHDL"    "

## 2024-09-16 PROBLEM — Z00.00 WELLNESS EXAMINATION: Status: RESOLVED | Noted: 2022-06-06 | Resolved: 2024-09-16

## 2024-12-23 DIAGNOSIS — R00.2 PALPITATIONS: ICD-10-CM

## 2024-12-23 DIAGNOSIS — E03.9 HYPOTHYROIDISM, UNSPECIFIED TYPE: ICD-10-CM

## 2024-12-23 DIAGNOSIS — E78.2 MIXED HYPERLIPIDEMIA: ICD-10-CM

## 2024-12-23 RX ORDER — METOPROLOL SUCCINATE 50 MG/1
50 TABLET, EXTENDED RELEASE ORAL DAILY
Qty: 90 TABLET | Refills: 1 | Status: SHIPPED | OUTPATIENT
Start: 2024-12-23 | End: 2025-12-23

## 2024-12-23 RX ORDER — LEVOTHYROXINE SODIUM 88 UG/1
88 TABLET ORAL
Qty: 90 TABLET | Refills: 1 | Status: SHIPPED | OUTPATIENT
Start: 2024-12-23 | End: 2025-12-23

## 2024-12-23 RX ORDER — ATORVASTATIN CALCIUM 10 MG/1
10 TABLET, FILM COATED ORAL NIGHTLY
Qty: 90 TABLET | Refills: 1 | Status: SHIPPED | OUTPATIENT
Start: 2024-12-23 | End: 2025-12-23

## 2024-12-23 NOTE — TELEPHONE ENCOUNTER
----- Message from Imax[employer] sent at 12/23/2024 10:39 AM CST -----  .Who Called: Ledy Kim    Refill or New Rx:Refill  RX Name and Strength:atorvastatin (LIPITOR) 10 MG tablet   How is the patient currently taking it? (ex. 1XDay):Sig - Route: Take 1 tablet (10 mg total) by mouth every evening. - Oral  Is this a 30 day or 90 day RX:90  Local or Mail Order:Mail order   List of preferred pharmacies on file (remove unneeded): [facility] [employer] MAIL DELIVERY - [facility] [address] [facility] [address]  Ordering Provider:Peri Arredondo MD      Refill or New Rx:Refill  RX Name and Strength:levothyroxine (SYNTHROID) 88 MCG tablet   How is the patient currently taking it? (ex. 1XDay):Sig - Route: Take 1 tablet (88 mcg total) by mouth before breakfast. - Oral  Is this a 30 day or 90 day RX:90  Local or Mail Order:Mail order   List of preferred pharmacies on file (remove unneeded): [facility] [employer] MAIL DELIVERY - [facility] [address]   Ordering Provider:Peri Arredondo MD        Refill or New Rx:Refill  RX Name and Strength:metoprolol succinate (TOPROL-XL) 50 MG 24 hr tablet   How is the patient currently taking it? (ex. 1XDay):Sig - Route: Take 1 tablet (50 mg total) by mouth once daily. - Oral  Is this a 30 day or 90 day RX:90  Local or Mail Order:Mail order   List of preferred pharmacies on file (remove unneeded): [facility] [employer] MAIL DELIVERY - [facility] [address] [facility] [address]  Ordering Provider:Peri Arredondo MD      Preferred Method of Contact: Phone Call  Patient's Preferred Phone Number on File: [phone]   Best Call Back Number, if different:  Additional Information: Caller is requesting a refill for the following. Please call to advise

## 2025-06-12 ENCOUNTER — LAB VISIT (OUTPATIENT)
Dept: LAB | Facility: HOSPITAL | Age: 70
End: 2025-06-12
Attending: FAMILY MEDICINE
Payer: MEDICARE

## 2025-06-12 ENCOUNTER — RESULTS FOLLOW-UP (OUTPATIENT)
Dept: FAMILY MEDICINE | Facility: CLINIC | Age: 70
End: 2025-06-12

## 2025-06-12 DIAGNOSIS — Z00.00 WELLNESS EXAMINATION: ICD-10-CM

## 2025-06-12 DIAGNOSIS — M85.80 OSTEOPENIA, UNSPECIFIED LOCATION: ICD-10-CM

## 2025-06-12 DIAGNOSIS — E03.9 HYPOTHYROIDISM, UNSPECIFIED TYPE: ICD-10-CM

## 2025-06-12 DIAGNOSIS — Z78.0 POSTMENOPAUSAL: ICD-10-CM

## 2025-06-12 DIAGNOSIS — E78.2 MIXED HYPERLIPIDEMIA: ICD-10-CM

## 2025-06-12 DIAGNOSIS — Z71.89 ADVANCED CARE PLANNING/COUNSELING DISCUSSION: ICD-10-CM

## 2025-06-12 DIAGNOSIS — R00.2 PALPITATIONS: ICD-10-CM

## 2025-06-12 DIAGNOSIS — Z12.31 BREAST CANCER SCREENING BY MAMMOGRAM: ICD-10-CM

## 2025-06-12 DIAGNOSIS — Z12.11 COLON CANCER SCREENING: ICD-10-CM

## 2025-06-12 LAB
ALBUMIN SERPL-MCNC: 3.8 G/DL (ref 3.4–4.8)
ALBUMIN/GLOB SERPL: 1 RATIO (ref 1.1–2)
ALP SERPL-CCNC: 104 UNIT/L (ref 40–150)
ALT SERPL-CCNC: 18 UNIT/L (ref 0–55)
ANION GAP SERPL CALC-SCNC: 7 MEQ/L
AST SERPL-CCNC: 20 UNIT/L (ref 11–45)
BASOPHILS # BLD AUTO: 0.03 X10(3)/MCL
BASOPHILS NFR BLD AUTO: 0.4 %
BILIRUB SERPL-MCNC: 0.6 MG/DL
BUN SERPL-MCNC: 12.3 MG/DL (ref 9.8–20.1)
CALCIUM SERPL-MCNC: 9.3 MG/DL (ref 8.4–10.2)
CHLORIDE SERPL-SCNC: 105 MMOL/L (ref 98–107)
CHOLEST SERPL-MCNC: 147 MG/DL
CHOLEST/HDLC SERPL: 4 {RATIO} (ref 0–5)
CO2 SERPL-SCNC: 28 MMOL/L (ref 23–31)
CREAT SERPL-MCNC: 0.78 MG/DL (ref 0.55–1.02)
CREAT/UREA NIT SERPL: 16
EOSINOPHIL # BLD AUTO: 0.23 X10(3)/MCL (ref 0–0.9)
EOSINOPHIL NFR BLD AUTO: 3.1 %
ERYTHROCYTE [DISTWIDTH] IN BLOOD BY AUTOMATED COUNT: 12.9 % (ref 11.5–17)
GFR SERPLBLD CREATININE-BSD FMLA CKD-EPI: >60 ML/MIN/1.73/M2
GLOBULIN SER-MCNC: 3.7 GM/DL (ref 2.4–3.5)
GLUCOSE SERPL-MCNC: 104 MG/DL (ref 82–115)
HCT VFR BLD AUTO: 46.3 % (ref 37–47)
HDLC SERPL-MCNC: 42 MG/DL (ref 35–60)
HGB BLD-MCNC: 15.3 G/DL (ref 12–16)
IMM GRANULOCYTES # BLD AUTO: 0.01 X10(3)/MCL (ref 0–0.04)
IMM GRANULOCYTES NFR BLD AUTO: 0.1 %
LDLC SERPL CALC-MCNC: 80 MG/DL (ref 50–140)
LYMPHOCYTES # BLD AUTO: 3.54 X10(3)/MCL (ref 0.6–4.6)
LYMPHOCYTES NFR BLD AUTO: 47.1 %
MCH RBC QN AUTO: 30.2 PG (ref 27–31)
MCHC RBC AUTO-ENTMCNC: 33 G/DL (ref 33–36)
MCV RBC AUTO: 91.5 FL (ref 80–94)
MONOCYTES # BLD AUTO: 0.64 X10(3)/MCL (ref 0.1–1.3)
MONOCYTES NFR BLD AUTO: 8.5 %
NEUTROPHILS # BLD AUTO: 3.06 X10(3)/MCL (ref 2.1–9.2)
NEUTROPHILS NFR BLD AUTO: 40.8 %
NRBC BLD AUTO-RTO: 0 %
PLATELET # BLD AUTO: 215 X10(3)/MCL (ref 130–400)
PMV BLD AUTO: 10.5 FL (ref 7.4–10.4)
POTASSIUM SERPL-SCNC: 4 MMOL/L (ref 3.5–5.1)
PROT SERPL-MCNC: 7.5 GM/DL (ref 5.8–7.6)
RBC # BLD AUTO: 5.06 X10(6)/MCL (ref 4.2–5.4)
SODIUM SERPL-SCNC: 140 MMOL/L (ref 136–145)
TRIGL SERPL-MCNC: 127 MG/DL (ref 37–140)
TSH SERPL-ACNC: 2.8 UIU/ML (ref 0.35–4.94)
VLDLC SERPL CALC-MCNC: 25 MG/DL
WBC # BLD AUTO: 7.51 X10(3)/MCL (ref 4.5–11.5)

## 2025-06-12 PROCEDURE — 80061 LIPID PANEL: CPT

## 2025-06-12 PROCEDURE — 36415 COLL VENOUS BLD VENIPUNCTURE: CPT

## 2025-06-12 PROCEDURE — 84443 ASSAY THYROID STIM HORMONE: CPT

## 2025-06-12 PROCEDURE — 80053 COMPREHEN METABOLIC PANEL: CPT

## 2025-06-12 PROCEDURE — 85025 COMPLETE CBC W/AUTO DIFF WBC: CPT

## 2025-06-16 ENCOUNTER — TELEPHONE (OUTPATIENT)
Dept: FAMILY MEDICINE | Facility: CLINIC | Age: 70
End: 2025-06-16
Payer: MEDICARE

## 2025-06-16 NOTE — TELEPHONE ENCOUNTER
Copied from CRM #[id number]. Topic: General Inquiry - Patient Advice  >> Jun 16, 2025  3:59 PM Kelsea wrote:  Who Called: Ledy Kim    Patient is returning phone call    Who Left Message for Patient: n/a    Does the patient know what this is regarding?: confirm appt    Preferred Method of Contact: Phone Call    Patient's Preferred Phone Number on File: [phone]     Best Call Back Number, if different:    Additional Information: pt called to confirm appt on 06/19, so if there was nothing else, no need to call back.

## 2025-06-19 ENCOUNTER — OFFICE VISIT (OUTPATIENT)
Dept: FAMILY MEDICINE | Facility: CLINIC | Age: 70
End: 2025-06-19
Payer: MEDICARE

## 2025-06-19 VITALS
HEIGHT: 65 IN | RESPIRATION RATE: 16 BRPM | WEIGHT: 165 LBS | BODY MASS INDEX: 27.49 KG/M2 | SYSTOLIC BLOOD PRESSURE: 118 MMHG | DIASTOLIC BLOOD PRESSURE: 72 MMHG | OXYGEN SATURATION: 98 % | TEMPERATURE: 98 F | HEART RATE: 75 BPM

## 2025-06-19 DIAGNOSIS — R79.9 ABNORMAL FINDING OF BLOOD CHEMISTRY, UNSPECIFIED: ICD-10-CM

## 2025-06-19 DIAGNOSIS — E78.2 MIXED HYPERLIPIDEMIA: ICD-10-CM

## 2025-06-19 DIAGNOSIS — R00.2 PALPITATIONS: ICD-10-CM

## 2025-06-19 DIAGNOSIS — Z00.00 WELLNESS EXAMINATION: Primary | ICD-10-CM

## 2025-06-19 DIAGNOSIS — M85.80 OSTEOPENIA, UNSPECIFIED LOCATION: ICD-10-CM

## 2025-06-19 DIAGNOSIS — Z71.89 ADVANCED CARE PLANNING/COUNSELING DISCUSSION: ICD-10-CM

## 2025-06-19 DIAGNOSIS — E03.9 HYPOTHYROIDISM, UNSPECIFIED TYPE: ICD-10-CM

## 2025-06-19 DIAGNOSIS — F17.210 CIGARETTE SMOKER: ICD-10-CM

## 2025-06-19 DIAGNOSIS — Z12.31 BREAST CANCER SCREENING BY MAMMOGRAM: ICD-10-CM

## 2025-06-19 DIAGNOSIS — Z12.11 COLON CANCER SCREENING: ICD-10-CM

## 2025-06-19 DIAGNOSIS — Z78.0 POSTMENOPAUSAL: ICD-10-CM

## 2025-06-19 DIAGNOSIS — Z12.4 PAP SMEAR FOR CERVICAL CANCER SCREENING: ICD-10-CM

## 2025-06-19 RX ORDER — METOPROLOL SUCCINATE 50 MG/1
50 TABLET, EXTENDED RELEASE ORAL DAILY
Qty: 90 TABLET | Refills: 3 | Status: SHIPPED | OUTPATIENT
Start: 2025-06-19 | End: 2026-06-19

## 2025-06-19 RX ORDER — LEVOTHYROXINE SODIUM 88 UG/1
88 TABLET ORAL
Qty: 90 TABLET | Refills: 3 | Status: SHIPPED | OUTPATIENT
Start: 2025-06-19 | End: 2026-06-19

## 2025-06-19 RX ORDER — ATORVASTATIN CALCIUM 10 MG/1
10 TABLET, FILM COATED ORAL NIGHTLY
Qty: 90 TABLET | Refills: 3 | Status: SHIPPED | OUTPATIENT
Start: 2025-06-19 | End: 2026-06-19

## 2025-06-19 NOTE — ASSESSMENT & PLAN NOTE
"On statin  Lab Results   Component Value Date    CHOL 147 06/12/2025    CHOL 142 06/05/2024    CHOL 145 06/05/2023     Lab Results   Component Value Date    HDL 42 06/12/2025    HDL 43 06/05/2024    HDL 42 06/05/2023     No results found for: "LDLCALC"  Lab Results   Component Value Date    TRIG 127 06/12/2025    TRIG 123 06/05/2024    TRIG 97 06/05/2023       No results found for: "CHOLHDL"    "

## 2025-06-19 NOTE — PROGRESS NOTES
Internal Medicine      Patient ID: [id number]     Chief Complaint: Medicare Annual Wellness     HPI:     Ledy Kim is a 69 y.o. female here today for a Medicare Annual Wellness visit and comprehensive Health Risk Assessment.     Patient presents to clinic unaccompanied for her wellness visit. She did labs prior to appt and it was reviewed with her  at time of appt today      She has HLD and palpitations.  She is on atrovastatin and metoprolol and asa     She has hypothyroidism.  tsh is wnl.  On synthroid 88mcg     Mammogram at [facility] 7/2024.  Last dexa 7/2024 at [facility] showed osteopenia.  Last pap 6/2021 is normal and HPV negative.  Pap today with assistance from nurse.  [facility] 6/2022. Ordered.      She is smoker.  Working on cutting back.   She is allergic to codeine      Health Maintenance         Date Due Completion Date    COVID-19 Vaccine (3 - 2024-25 season) 09/01/2024 8/24/2021    Hemoglobin A1c (Diabetic Prevention Screening) 06/02/2025 6/2/2022    Colorectal Cancer Screening 06/20/2025 6/20/2022    Mammogram 07/24/2025 7/24/2024    TETANUS VACCINE 06/19/2026 (Originally 7/21/1975) ---    Shingles Vaccine (1 of 2) 06/19/2026 (Originally 7/21/2007) ---    Pneumococcal Vaccines (Age 50+) (1 of 2 - PCV) 06/19/2026 (Originally 7/21/1976) ---    Influenza Vaccine (Season Ended) 09/01/2025 10/28/2020    DEXA Scan 07/24/2027 7/24/2024    Lipid Panel 06/12/2030 6/12/2025    RSV Vaccine (Age 60+ and Pregnant patients) (1 - 1-dose 75+ series) 07/21/2032 ---             History reviewed. No pertinent past medical history.     History reviewed. No pertinent surgical history.     Social History     Socioeconomic History    Marital status:    Tobacco Use    Smoking status: Every Day     Current packs/day: 1.00     Average packs/day: 1 pack/day for 15.0 years (15.0 ttl pk-yrs)     Types: Cigarettes    Smokeless tobacco: Never   Substance and Sexual Activity    Alcohol use: Never    Drug use: Never    Sexual  "activity: Not Currently     Partners: Male        No family history on file.     Current Outpatient Medications   Medication Instructions    atorvastatin (LIPITOR) 10 mg, Oral, Nightly    levothyroxine (SYNTHROID) 88 mcg, Oral, Before breakfast    metoprolol succinate (TOPROL-XL) 50 mg, Oral, Daily       Review of patient's allergies indicates:   Allergen Reactions    Codeine Itching        Immunization History   Administered Date(s) Administered    COVID-19, MRNA, LN-S, PF (Pfizer) (Purple Cap) 08/03/2021, 08/24/2021    Influenza 11/06/2019    Influenza - Quadrivalent 11/06/2019    Influenza - Quadrivalent - PF *Preferred* (6 months and older) 10/19/2018, 10/28/2020    Influenza - Trivalent - Fluarix, Flulaval, Fluzone, Afluria - PF 01/22/2017, 11/06/2019        Patient Care Team:  Peri Arredondo MD as PCP - General (Family Medicine)    Subjective:     Review of Systems   Constitutional: Negative.    HENT: Negative.     Respiratory: Negative.     Cardiovascular: Negative.    Gastrointestinal: Negative.    Genitourinary: Negative.        12 point review of systems conducted, negative except as stated in the history of present illness. See HPI for details.    Objective:     Visit Vitals  /72 (BP Location: Left arm, Patient Position: Sitting)   Pulse 75   Temp 98.4 °F (36.9 °C) (Oral)   Resp 16   Ht 5' 5" (1.651 m)   Wt 74.8 kg (165 lb)   SpO2 98%   BMI 27.46 kg/m²       Physical Exam  Vitals and nursing note reviewed. Exam conducted with a chaperone present.   Constitutional:       Appearance: Normal appearance. She is normal weight.   HENT:      Head: Normocephalic and atraumatic.      Nose: Nose normal.      Mouth/Throat:      Mouth: Mucous membranes are moist.      Pharynx: Oropharynx is clear.   Eyes:      Extraocular Movements: Extraocular movements intact.   Cardiovascular:      Rate and Rhythm: Normal rate and regular rhythm.      Pulses: Normal pulses.      Heart sounds: Normal heart sounds. "   Pulmonary:      Effort: Pulmonary effort is normal.      Breath sounds: Normal breath sounds.   Genitourinary:     General: Normal vulva.      Exam position: Lithotomy position.      Vagina: Normal.      Cervix: Normal.      Uterus: Normal.       Adnexa: Right adnexa normal and left adnexa normal.   Musculoskeletal:         General: Normal range of motion.      Cervical back: Normal range of motion.   Skin:     General: Skin is warm and dry.   Neurological:      General: No focal deficit present.      Mental Status: She is alert and oriented to person, place, and time. Mental status is at baseline.   Psychiatric:         Mood and Affect: Mood normal.         Assessment:       ICD-10-CM ICD-9-CM   1. Wellness examination  Z00.00 V70.0   2. Advanced care planning/counseling discussion  Z71.89 V65.49   3. Mixed hyperlipidemia  E78.2 272.2   4. Palpitations  R00.2 785.1   5. Hypothyroidism, unspecified type  E03.9 244.9   6. Breast cancer screening by mammogram  Z12.31 V76.12   7. Postmenopausal  Z78.0 V49.81   8. Osteopenia, unspecified location  M85.80 733.90   9. Colon cancer screening  Z12.11 V76.51   10. Pap smear for cervical cancer screening  Z12.4 V76.2   11. Cigarette smoker  F17.210 305.1   12. Abnormal finding of blood chemistry, unspecified  R79.9 790.6        Plan:     1. Wellness examination  Assessment & Plan:  Previously done labs reviewed with patient at time of visit  Pap 6/2021 normal and HPV negative. Today in office with assistance from nurse.   Mammogram at [facility] 7/2024. ordered  dexa 6/2021 and 7/2024 showed osteopenia.   cologuard 6/2022. Ordered  Encouraged smoking cessation. Referral placed.     Advanced care planning discussed and paperwork given        Orders:  -     CBC Auto Differential; Future; Expected date: 06/19/2026  -     Comprehensive Metabolic Panel; Future; Expected date: 06/19/2026  -     Lipid Panel; Future; Expected date: 06/19/2026  -     TSH; Future; Expected date:  "06/19/2026  -     Hemoglobin A1C; Future; Expected date: 06/19/2026  -     Urinalysis; Future; Expected date: 06/19/2026  -     Vitamin D; Future; Expected date: 06/19/2026    2. Advanced care planning/counseling discussion  Assessment & Plan:  Advanced care planning discussed and paperwork given.    I attest that I have had a face to face discussion with patient and or surrogate decision maker.   Included surrogate decision maker: NO  Advanced directive in chart: NO  LAPOST: NO    Total time spent: 16 minutes      Orders:  -     CBC Auto Differential; Future; Expected date: 06/19/2026  -     Comprehensive Metabolic Panel; Future; Expected date: 06/19/2026  -     Lipid Panel; Future; Expected date: 06/19/2026  -     TSH; Future; Expected date: 06/19/2026  -     Hemoglobin A1C; Future; Expected date: 06/19/2026  -     Urinalysis; Future; Expected date: 06/19/2026  -     Vitamin D; Future; Expected date: 06/19/2026    3. Mixed hyperlipidemia  Assessment & Plan:  On statin  Lab Results   Component Value Date    CHOL 147 06/12/2025    CHOL 142 06/05/2024    CHOL 145 06/05/2023     Lab Results   Component Value Date    HDL 42 06/12/2025    HDL 43 06/05/2024    HDL 42 06/05/2023     No results found for: "LDLCALC"  Lab Results   Component Value Date    TRIG 127 06/12/2025    TRIG 123 06/05/2024    TRIG 97 06/05/2023       No results found for: "CHOLHDL"      Orders:  -     CBC Auto Differential; Future; Expected date: 06/19/2026  -     Comprehensive Metabolic Panel; Future; Expected date: 06/19/2026  -     Lipid Panel; Future; Expected date: 06/19/2026  -     TSH; Future; Expected date: 06/19/2026  -     Hemoglobin A1C; Future; Expected date: 06/19/2026  -     Urinalysis; Future; Expected date: 06/19/2026  -     Vitamin D; Future; Expected date: 06/19/2026  -     atorvastatin (LIPITOR) 10 MG tablet; Take 1 tablet (10 mg total) by mouth every evening.  Dispense: 90 tablet; Refill: 3    4. Palpitations  Assessment & " Plan:  On metoprolol    Orders:  -     CBC Auto Differential; Future; Expected date: 06/19/2026  -     Comprehensive Metabolic Panel; Future; Expected date: 06/19/2026  -     Lipid Panel; Future; Expected date: 06/19/2026  -     TSH; Future; Expected date: 06/19/2026  -     Hemoglobin A1C; Future; Expected date: 06/19/2026  -     Urinalysis; Future; Expected date: 06/19/2026  -     Vitamin D; Future; Expected date: 06/19/2026  -     metoprolol succinate (TOPROL-XL) 50 MG 24 hr tablet; Take 1 tablet (50 mg total) by mouth once daily.  Dispense: 90 tablet; Refill: 3    5. Hypothyroidism, unspecified type  Assessment & Plan:  Lab Results   Component Value Date    TSH 2.803 06/12/2025     Continue on current prescription med    Orders:  -     CBC Auto Differential; Future; Expected date: 06/19/2026  -     Comprehensive Metabolic Panel; Future; Expected date: 06/19/2026  -     Lipid Panel; Future; Expected date: 06/19/2026  -     TSH; Future; Expected date: 06/19/2026  -     Hemoglobin A1C; Future; Expected date: 06/19/2026  -     Urinalysis; Future; Expected date: 06/19/2026  -     Vitamin D; Future; Expected date: 06/19/2026  -     levothyroxine (SYNTHROID) 88 MCG tablet; Take 1 tablet (88 mcg total) by mouth before breakfast.  Dispense: 90 tablet; Refill: 3    6. Breast cancer screening by mammogram  Assessment & Plan:  [facility] 7/2024. at [facility]. Repeat ordered    Orders:  -     Mammo Digital Screening Bilat w/ Gab (XPD); Future; Expected date: 07/19/2025  -     CBC Auto Differential; Future; Expected date: 06/19/2026  -     Comprehensive Metabolic Panel; Future; Expected date: 06/19/2026  -     Lipid Panel; Future; Expected date: 06/19/2026  -     TSH; Future; Expected date: 06/19/2026  -     Hemoglobin A1C; Future; Expected date: 06/19/2026  -     Urinalysis; Future; Expected date: 06/19/2026  -     Vitamin D; Future; Expected date: 06/19/2026    7. Postmenopausal  Assessment & Plan:  dexa 7/2021 and 7/2024 showed  osteopenia.On calcium and vit d. ordered    Orders:  -     CBC Auto Differential; Future; Expected date: 06/19/2026  -     Comprehensive Metabolic Panel; Future; Expected date: 06/19/2026  -     Lipid Panel; Future; Expected date: 06/19/2026  -     TSH; Future; Expected date: 06/19/2026  -     Hemoglobin A1C; Future; Expected date: 06/19/2026  -     Urinalysis; Future; Expected date: 06/19/2026  -     Vitamin D; Future; Expected date: 06/19/2026    8. Osteopenia, unspecified location  Assessment & Plan:  See postmenopausal A&P    Orders:  -     CBC Auto Differential; Future; Expected date: 06/19/2026  -     Comprehensive Metabolic Panel; Future; Expected date: 06/19/2026  -     Lipid Panel; Future; Expected date: 06/19/2026  -     TSH; Future; Expected date: 06/19/2026  -     Hemoglobin A1C; Future; Expected date: 06/19/2026  -     Urinalysis; Future; Expected date: 06/19/2026  -     Vitamin D; Future; Expected date: 06/19/2026    9. Colon cancer screening  Assessment & Plan:  cologuard 6/2022. ordered    Orders:  -     Cologuard Screening (Multitarget Stool DNA); Future; Expected date: 06/19/2025  -     CBC Auto Differential; Future; Expected date: 06/19/2026  -     Comprehensive Metabolic Panel; Future; Expected date: 06/19/2026  -     Lipid Panel; Future; Expected date: 06/19/2026  -     TSH; Future; Expected date: 06/19/2026  -     Hemoglobin A1C; Future; Expected date: 06/19/2026  -     Urinalysis; Future; Expected date: 06/19/2026  -     Vitamin D; Future; Expected date: 06/19/2026    10. Pap smear for cervical cancer screening  Assessment & Plan:  Pap today with assistance from nurse.  Will call with results when available      11. Cigarette smoker  Assessment & Plan:  Referral to smoking cessation placed.    Orders:  -     Ambulatory referral/consult to Smoking Cessation Program; Future; Expected date: 06/26/2025    12. Abnormal finding of blood chemistry, unspecified  -     Hemoglobin A1C; Future; Expected  date: 06/19/2026         A comprehensive HEALTH RISK ASSESSMENT was completed today. Results are summarized below:    There are NO EMOTIONAL/SOCIAL CONCERNS identified on today's screening for Social Isolation, Depression and Anxiety.    There are NO COGNITIVE FUNCTION CONCERNS identified on today's screening.  There are NO FUNCTIONAL OR SAFETY CONCERNS were identified on today's screening for Physical Symptoms, Nutritional, Cognitive Function, Home Safety/Living Situation, Fall Risk, Activities of Daily Living, Independent Activities of Daily Living, Physical Activity, Timed Up and Go test and Whisper test.   The patient reports NO OPIOID PRESCRIPTIONS. This was confirmed through medication reconciliation.    The patient is A CURRENT TOBACCO USER.  Tobacco Use: High Risk (6/19/2025)    Patient History     Smoking Tobacco Use: Every Day     Smokeless Tobacco Use: Never     Passive Exposure: Not on file           All Questions regarding food, transportation or housing were not answered today.    The patient was asked and declined the use of a free .    Advance Care Planning   Today we discussed advance care planning. She is interested in learning more about how to make Advance Directives. Information was provided and I offered to discuss more at her discretion.         Provided patient with a 5-10 year written screening schedule and personal prevention plan. Recommendations were developed using the USPSTF age appropriate recommendations. Education, counseling, and referrals were provided as needed. After Visit Summary printed and given to patient, which includes a list of additional screenings\tests needed.    Follow up in about 1 year (around 6/19/2026). In addition to their scheduled follow up, the patient has also been instructed to follow up on as needed basis.     No future appointments.       Peri Arredondo MD

## 2025-06-19 NOTE — ASSESSMENT & PLAN NOTE
Lab Results   Component Value Date    TSH 2.803 06/12/2025     Continue on current prescription med

## 2025-06-19 NOTE — PROGRESS NOTES
Subjective:        Patient ID: Ledy Kim is a 69 y.o. female.    Chief Complaint: No chief complaint on file.      HPI  Review of Systems      Review of patient's allergies indicates:   Allergen Reactions    Codeine Itching      There were no vitals filed for this visit.   Social History     Socioeconomic History    Marital status:    Tobacco Use    Smoking status: Every Day     Current packs/day: 1.00     Average packs/day: 1 pack/day for 15.0 years (15.0 ttl pk-yrs)     Types: Cigarettes    Smokeless tobacco: Never   Substance and Sexual Activity    Alcohol use: Never    Drug use: Never    Sexual activity: Not Currently     Partners: Male      No family history on file.       Objective:     Physical Exam  Medications Ordered Prior to Encounter[1]  Health Maintenance   Topic Date Due    TETANUS VACCINE  Never done    Pneumococcal Vaccines (Age 50+) (1 of 2 - PCV) Never done    Shingles Vaccine (1 of 2) Never done    COVID-19 Vaccine (3 - 2024-25 season) 09/01/2024    Hemoglobin A1c (Diabetic Prevention Screening)  06/02/2025    Colorectal Cancer Screening  06/20/2025    Mammogram  07/24/2025    Influenza Vaccine (Season Ended) 09/01/2025    DEXA Scan  07/24/2027    Lipid Panel  06/12/2030    RSV Vaccine (Age 60+ and Pregnant patients) (1 - 1-dose 75+ series) 07/21/2032    Hepatitis C Screening  Completed      Results for orders placed or performed in visit on 06/12/25   Comprehensive Metabolic Panel    Collection Time: 06/12/25  7:10 AM   Result Value Ref Range    Sodium 140 136 - 145 mmol/L    Potassium 4.0 3.5 - 5.1 mmol/L    Chloride 105 98 - 107 mmol/L    CO2 28 23 - 31 mmol/L    Glucose 104 82 - 115 mg/dL    Blood Urea Nitrogen 12.3 9.8 - 20.1 mg/dL    Creatinine 0.78 0.55 - 1.02 mg/dL    Calcium 9.3 8.4 - 10.2 mg/dL    Protein Total 7.5 5.8 - 7.6 gm/dL    Albumin 3.8 3.4 - 4.8 g/dL    Globulin 3.7 (H) 2.4 - 3.5 gm/dL    Albumin/Globulin Ratio 1.0 (L) 1.1 - 2.0 ratio    Bilirubin Total 0.6 <=1.5  mg/dL     40 - 150 unit/L    ALT 18 0 - 55 unit/L    AST 20 11 - 45 unit/L    eGFR >60 mL/min/1.73/m2    Anion Gap 7.0 mEq/L    BUN/Creatinine Ratio 16    Lipid Panel    Collection Time: 06/12/25  7:10 AM   Result Value Ref Range    Cholesterol Total 147 <=200 mg/dL    HDL Cholesterol 42 35 - 60 mg/dL    Triglyceride 127 37 - 140 mg/dL    Cholesterol/HDL Ratio 4 0 - 5    Very Low Density Lipoprotein 25     LDL Cholesterol 80.00 50.00 - 140.00 mg/dL   TSH    Collection Time: 06/12/25  7:10 AM   Result Value Ref Range    TSH 2.803 0.350 - 4.940 uIU/mL   CBC with Differential    Collection Time: 06/12/25  7:10 AM   Result Value Ref Range    WBC 7.51 4.50 - 11.50 x10(3)/mcL    RBC 5.06 4.20 - 5.40 x10(6)/mcL    Hgb 15.3 12.0 - 16.0 g/dL    Hct 46.3 37.0 - 47.0 %    MCV 91.5 80.0 - 94.0 fL    MCH 30.2 27.0 - 31.0 pg    MCHC 33.0 33.0 - 36.0 g/dL    RDW 12.9 11.5 - 17.0 %    Platelet 215 130 - 400 x10(3)/mcL    MPV 10.5 (H) 7.4 - 10.4 fL    Neut % 40.8 %    Lymph % 47.1 %    Mono % 8.5 %    Eos % 3.1 %    Basophil % 0.4 %    Imm Grans % 0.1 %    Neut # 3.06 2.1 - 9.2 x10(3)/mcL    Lymph # 3.54 0.6 - 4.6 x10(3)/mcL    Mono # 0.64 0.1 - 1.3 x10(3)/mcL    Eos # 0.23 0 - 0.9 x10(3)/mcL    Baso # 0.03 <=0.2 x10(3)/mcL    Imm Gran # 0.01 0.00 - 0.04 x10(3)/mcL    NRBC% 0.0 %          Assessment & Plan:     Active Problem List with Overview Notes    Diagnosis Date Noted    Advanced care planning/counseling discussion 06/07/2023    Osteopenia 06/07/2023    Hyperlipidemia 06/06/2022    Palpitations 06/06/2022    Hypothyroidism 06/06/2022    Postmenopausal 06/06/2022    Cigarette smoker 06/06/2022    Breast cancer screening by mammogram 06/06/2022    Colon cancer screening 06/06/2022       {There are no diagnoses linked to this encounter. (Refresh or delete this SmartLink)}     No follow-ups on file.          [1]   Current Outpatient Medications on File Prior to Visit   Medication Sig Dispense Refill    atorvastatin  (LIPITOR) 10 MG tablet Take 1 tablet (10 mg total) by mouth every evening. 90 tablet 1    levothyroxine (SYNTHROID) 88 MCG tablet Take 1 tablet (88 mcg total) by mouth before breakfast. 90 tablet 1    metoprolol succinate (TOPROL-XL) 50 MG 24 hr tablet Take 1 tablet (50 mg total) by mouth once daily. 90 tablet 1     No current facility-administered medications on file prior to visit.

## 2025-06-19 NOTE — ASSESSMENT & PLAN NOTE
Previously done labs reviewed with patient at time of visit  Pap 6/2021 normal and HPV negative. Today in office with assistance from nurse.   Mammogram at [facility] 7/2024. ordered  dexa 6/2021 and 7/2024 showed osteopenia.   cologuard 6/2022. Ordered  Encouraged smoking cessation. Referral placed.     Advanced care planning discussed and paperwork given

## 2025-06-24 ENCOUNTER — RESULTS FOLLOW-UP (OUTPATIENT)
Dept: FAMILY MEDICINE | Facility: CLINIC | Age: 70
End: 2025-06-24

## 2025-06-24 DIAGNOSIS — R19.5 POSITIVE COLORECTAL CANCER SCREENING USING COLOGUARD TEST: Primary | ICD-10-CM

## 2025-06-25 ENCOUNTER — RESULTS FOLLOW-UP (OUTPATIENT)
Dept: FAMILY MEDICINE | Facility: CLINIC | Age: 70
End: 2025-06-25

## 2025-06-25 ENCOUNTER — DOCUMENTATION ONLY (OUTPATIENT)
Dept: FAMILY MEDICINE | Facility: CLINIC | Age: 70
End: 2025-06-25
Payer: MEDICARE

## 2025-06-25 LAB
HIGH RISK HPV: NEGATIVE
PAP RECOMMENDATION EXT: NORMAL
PAP SMEAR: NORMAL

## 2025-07-14 DIAGNOSIS — E03.9 HYPOTHYROIDISM, UNSPECIFIED TYPE: ICD-10-CM

## 2025-07-14 RX ORDER — LEVOTHYROXINE SODIUM 88 UG/1
88 TABLET ORAL
Qty: 90 TABLET | Refills: 3 | Status: SHIPPED | OUTPATIENT
Start: 2025-07-14

## 2025-08-01 ENCOUNTER — DOCUMENTATION ONLY (OUTPATIENT)
Dept: FAMILY MEDICINE | Facility: CLINIC | Age: 70
End: 2025-08-01
Payer: MEDICARE

## 2025-08-01 LAB — CRC RECOMMENDATION EXT: NORMAL
